# Patient Record
Sex: MALE | Race: WHITE | NOT HISPANIC OR LATINO | Employment: UNEMPLOYED | URBAN - METROPOLITAN AREA
[De-identification: names, ages, dates, MRNs, and addresses within clinical notes are randomized per-mention and may not be internally consistent; named-entity substitution may affect disease eponyms.]

---

## 2022-11-14 ENCOUNTER — HOSPITAL ENCOUNTER (INPATIENT)
Facility: CLINIC | Age: 27
LOS: 3 days | Discharge: HOME | DRG: 897 | End: 2022-11-17
Attending: EMERGENCY MEDICINE | Admitting: FAMILY MEDICINE
Payer: COMMERCIAL

## 2022-11-14 DIAGNOSIS — Z11.52 ENCOUNTER FOR SCREENING LABORATORY TESTING FOR SEVERE ACUTE RESPIRATORY SYNDROME CORONAVIRUS 2 (SARS-COV-2): ICD-10-CM

## 2022-11-14 DIAGNOSIS — F41.0 PANIC ATTACKS: ICD-10-CM

## 2022-11-14 DIAGNOSIS — F33.41 MAJOR DEPRESSIVE DISORDER, RECURRENT, IN PARTIAL REMISSION (H): Primary | ICD-10-CM

## 2022-11-14 DIAGNOSIS — F10.930 ALCOHOL WITHDRAWAL SYNDROME WITHOUT COMPLICATION (H): ICD-10-CM

## 2022-11-14 LAB
ALBUMIN SERPL-MCNC: 4 G/DL (ref 3.4–5)
ALCOHOL BREATH TEST: 0.02 (ref 0–0.01)
ALP SERPL-CCNC: 141 U/L (ref 40–150)
ALT SERPL W P-5'-P-CCNC: 39 U/L (ref 0–70)
AMPHETAMINES UR QL SCN: ABNORMAL
ANION GAP SERPL CALCULATED.3IONS-SCNC: 20 MMOL/L (ref 3–14)
AST SERPL W P-5'-P-CCNC: 46 U/L (ref 0–45)
BARBITURATES UR QL: ABNORMAL
BASOPHILS # BLD MANUAL: 0 10E3/UL (ref 0–0.2)
BASOPHILS NFR BLD MANUAL: 0 %
BENZODIAZ UR QL: ABNORMAL
BILIRUB SERPL-MCNC: 1.5 MG/DL (ref 0.2–1.3)
BUN SERPL-MCNC: 11 MG/DL (ref 7–30)
BURR CELLS BLD QL SMEAR: SLIGHT
CALCIUM SERPL-MCNC: 9.4 MG/DL (ref 8.5–10.1)
CANNABINOIDS UR QL SCN: ABNORMAL
CHLORIDE BLD-SCNC: 94 MMOL/L (ref 94–109)
CO2 SERPL-SCNC: 26 MMOL/L (ref 20–32)
COCAINE UR QL: ABNORMAL
CREAT SERPL-MCNC: 0.58 MG/DL (ref 0.66–1.25)
EOSINOPHIL # BLD MANUAL: 0 10E3/UL (ref 0–0.7)
EOSINOPHIL NFR BLD MANUAL: 0 %
ERYTHROCYTE [DISTWIDTH] IN BLOOD BY AUTOMATED COUNT: 13.9 % (ref 10–15)
ETHANOL UR QL SCN: ABNORMAL
GFR SERPL CREATININE-BSD FRML MDRD: >90 ML/MIN/1.73M2
GLUCOSE BLD-MCNC: 185 MG/DL (ref 70–99)
HCT VFR BLD AUTO: 41.3 % (ref 40–53)
HGB BLD-MCNC: 14.9 G/DL (ref 13.3–17.7)
HOLD SPECIMEN: NORMAL
KETONES BLD-SCNC: 0 MMOL/L (ref 0–0.6)
LACTATE SERPL-SCNC: 1.7 MMOL/L (ref 0.7–2)
LIPASE SERPL-CCNC: 635 U/L (ref 73–393)
LYMPHOCYTES # BLD MANUAL: 0 10E3/UL (ref 0.8–5.3)
LYMPHOCYTES NFR BLD MANUAL: 0 %
MAGNESIUM SERPL-MCNC: 1.3 MG/DL (ref 1.6–2.3)
MCH RBC QN AUTO: 34.1 PG (ref 26.5–33)
MCHC RBC AUTO-ENTMCNC: 36.1 G/DL (ref 31.5–36.5)
MCV RBC AUTO: 95 FL (ref 78–100)
MONOCYTES # BLD MANUAL: 0.8 10E3/UL (ref 0–1.3)
MONOCYTES NFR BLD MANUAL: 4 %
NEUTROPHILS # BLD MANUAL: 18.1 10E3/UL (ref 1.6–8.3)
NEUTROPHILS NFR BLD MANUAL: 96 %
OPIATES UR QL SCN: ABNORMAL
PHOSPHATE SERPL-MCNC: 1.8 MG/DL (ref 2.5–4.5)
PLAT MORPH BLD: ABNORMAL
PLATELET # BLD AUTO: 526 10E3/UL (ref 150–450)
POTASSIUM BLD-SCNC: 3.3 MMOL/L (ref 3.4–5.3)
PROT SERPL-MCNC: 8 G/DL (ref 6.8–8.8)
RBC # BLD AUTO: 4.37 10E6/UL (ref 4.4–5.9)
RBC MORPH BLD: ABNORMAL
SARS-COV-2 RNA RESP QL NAA+PROBE: NEGATIVE
SODIUM SERPL-SCNC: 140 MMOL/L (ref 133–144)
WBC # BLD AUTO: 18.9 10E3/UL (ref 4–11)

## 2022-11-14 PROCEDURE — 84100 ASSAY OF PHOSPHORUS: CPT | Performed by: STUDENT IN AN ORGANIZED HEALTH CARE EDUCATION/TRAINING PROGRAM

## 2022-11-14 PROCEDURE — 96361 HYDRATE IV INFUSION ADD-ON: CPT | Performed by: EMERGENCY MEDICINE

## 2022-11-14 PROCEDURE — 99285 EMERGENCY DEPT VISIT HI MDM: CPT | Mod: 25 | Performed by: EMERGENCY MEDICINE

## 2022-11-14 PROCEDURE — 96374 THER/PROPH/DIAG INJ IV PUSH: CPT | Performed by: EMERGENCY MEDICINE

## 2022-11-14 PROCEDURE — 36415 COLL VENOUS BLD VENIPUNCTURE: CPT | Performed by: EMERGENCY MEDICINE

## 2022-11-14 PROCEDURE — 80053 COMPREHEN METABOLIC PANEL: CPT | Performed by: EMERGENCY MEDICINE

## 2022-11-14 PROCEDURE — 120N000002 HC R&B MED SURG/OB UMMC

## 2022-11-14 PROCEDURE — 250N000013 HC RX MED GY IP 250 OP 250 PS 637: Performed by: STUDENT IN AN ORGANIZED HEALTH CARE EDUCATION/TRAINING PROGRAM

## 2022-11-14 PROCEDURE — 80307 DRUG TEST PRSMV CHEM ANLYZR: CPT | Performed by: EMERGENCY MEDICINE

## 2022-11-14 PROCEDURE — U0003 INFECTIOUS AGENT DETECTION BY NUCLEIC ACID (DNA OR RNA); SEVERE ACUTE RESPIRATORY SYNDROME CORONAVIRUS 2 (SARS-COV-2) (CORONAVIRUS DISEASE [COVID-19]), AMPLIFIED PROBE TECHNIQUE, MAKING USE OF HIGH THROUGHPUT TECHNOLOGIES AS DESCRIBED BY CMS-2020-01-R: HCPCS | Performed by: EMERGENCY MEDICINE

## 2022-11-14 PROCEDURE — 85027 COMPLETE CBC AUTOMATED: CPT | Performed by: EMERGENCY MEDICINE

## 2022-11-14 PROCEDURE — 258N000003 HC RX IP 258 OP 636: Performed by: STUDENT IN AN ORGANIZED HEALTH CARE EDUCATION/TRAINING PROGRAM

## 2022-11-14 PROCEDURE — 258N000003 HC RX IP 258 OP 636: Performed by: EMERGENCY MEDICINE

## 2022-11-14 PROCEDURE — 83690 ASSAY OF LIPASE: CPT | Performed by: STUDENT IN AN ORGANIZED HEALTH CARE EDUCATION/TRAINING PROGRAM

## 2022-11-14 PROCEDURE — HZ2ZZZZ DETOXIFICATION SERVICES FOR SUBSTANCE ABUSE TREATMENT: ICD-10-PCS | Performed by: FAMILY MEDICINE

## 2022-11-14 PROCEDURE — 96375 TX/PRO/DX INJ NEW DRUG ADDON: CPT | Performed by: EMERGENCY MEDICINE

## 2022-11-14 PROCEDURE — 83735 ASSAY OF MAGNESIUM: CPT | Performed by: STUDENT IN AN ORGANIZED HEALTH CARE EDUCATION/TRAINING PROGRAM

## 2022-11-14 PROCEDURE — 250N000013 HC RX MED GY IP 250 OP 250 PS 637: Performed by: EMERGENCY MEDICINE

## 2022-11-14 PROCEDURE — 250N000011 HC RX IP 250 OP 636: Performed by: EMERGENCY MEDICINE

## 2022-11-14 PROCEDURE — 250N000011 HC RX IP 250 OP 636: Performed by: STUDENT IN AN ORGANIZED HEALTH CARE EDUCATION/TRAINING PROGRAM

## 2022-11-14 PROCEDURE — C9803 HOPD COVID-19 SPEC COLLECT: HCPCS | Performed by: EMERGENCY MEDICINE

## 2022-11-14 PROCEDURE — 258N000003 HC RX IP 258 OP 636

## 2022-11-14 PROCEDURE — 99285 EMERGENCY DEPT VISIT HI MDM: CPT | Performed by: EMERGENCY MEDICINE

## 2022-11-14 PROCEDURE — 85007 BL SMEAR W/DIFF WBC COUNT: CPT | Performed by: EMERGENCY MEDICINE

## 2022-11-14 PROCEDURE — 82010 KETONE BODYS QUAN: CPT | Performed by: EMERGENCY MEDICINE

## 2022-11-14 PROCEDURE — 99222 1ST HOSP IP/OBS MODERATE 55: CPT | Mod: AI | Performed by: FAMILY MEDICINE

## 2022-11-14 PROCEDURE — 83605 ASSAY OF LACTIC ACID: CPT | Performed by: EMERGENCY MEDICINE

## 2022-11-14 PROCEDURE — 82075 ASSAY OF BREATH ETHANOL: CPT | Performed by: EMERGENCY MEDICINE

## 2022-11-14 RX ORDER — OLANZAPINE 5 MG/1
5-10 TABLET, ORALLY DISINTEGRATING ORAL EVERY 6 HOURS PRN
Status: DISCONTINUED | OUTPATIENT
Start: 2022-11-14 | End: 2022-11-17 | Stop reason: HOSPADM

## 2022-11-14 RX ORDER — FOLIC ACID 1 MG/1
1 TABLET ORAL DAILY
Status: DISCONTINUED | OUTPATIENT
Start: 2022-11-14 | End: 2022-11-17 | Stop reason: HOSPADM

## 2022-11-14 RX ORDER — SODIUM CHLORIDE 9 MG/ML
INJECTION, SOLUTION INTRAVENOUS
Status: COMPLETED
Start: 2022-11-14 | End: 2022-11-14

## 2022-11-14 RX ORDER — HALOPERIDOL 5 MG/ML
2.5-5 INJECTION INTRAMUSCULAR EVERY 6 HOURS PRN
Status: DISCONTINUED | OUTPATIENT
Start: 2022-11-14 | End: 2022-11-17 | Stop reason: HOSPADM

## 2022-11-14 RX ORDER — CALCIUM CARBONATE 500 MG/1
500 TABLET, CHEWABLE ORAL 2 TIMES DAILY PRN
Status: DISCONTINUED | OUTPATIENT
Start: 2022-11-14 | End: 2022-11-17 | Stop reason: HOSPADM

## 2022-11-14 RX ORDER — GABAPENTIN 300 MG/1
600 CAPSULE ORAL EVERY 8 HOURS
Status: DISCONTINUED | OUTPATIENT
Start: 2022-11-18 | End: 2022-11-17 | Stop reason: HOSPADM

## 2022-11-14 RX ORDER — GABAPENTIN 600 MG/1
1200 TABLET ORAL ONCE
Status: COMPLETED | OUTPATIENT
Start: 2022-11-14 | End: 2022-11-14

## 2022-11-14 RX ORDER — LIDOCAINE 40 MG/G
CREAM TOPICAL
Status: DISCONTINUED | OUTPATIENT
Start: 2022-11-14 | End: 2022-11-14

## 2022-11-14 RX ORDER — BACLOFEN 20 MG/1
20 TABLET ORAL 3 TIMES DAILY
Status: ON HOLD | COMMUNITY
Start: 2021-12-16 | End: 2022-11-17

## 2022-11-14 RX ORDER — LIDOCAINE 40 MG/G
CREAM TOPICAL
Status: DISCONTINUED | OUTPATIENT
Start: 2022-11-14 | End: 2022-11-17 | Stop reason: HOSPADM

## 2022-11-14 RX ORDER — GABAPENTIN 300 MG/1
900 CAPSULE ORAL EVERY 8 HOURS
Status: DISCONTINUED | OUTPATIENT
Start: 2022-11-15 | End: 2022-11-17 | Stop reason: HOSPADM

## 2022-11-14 RX ORDER — DIAZEPAM 10 MG
10 TABLET ORAL EVERY 30 MIN PRN
Status: DISCONTINUED | OUTPATIENT
Start: 2022-11-14 | End: 2022-11-17 | Stop reason: HOSPADM

## 2022-11-14 RX ORDER — ONDANSETRON 2 MG/ML
4 INJECTION INTRAMUSCULAR; INTRAVENOUS EVERY 30 MIN PRN
Status: DISCONTINUED | OUTPATIENT
Start: 2022-11-14 | End: 2022-11-14

## 2022-11-14 RX ORDER — GABAPENTIN 300 MG/1
300 CAPSULE ORAL EVERY 8 HOURS
Status: DISCONTINUED | OUTPATIENT
Start: 2022-11-20 | End: 2022-11-17 | Stop reason: HOSPADM

## 2022-11-14 RX ORDER — POTASSIUM CHLORIDE 1.5 G/1.58G
20 POWDER, FOR SOLUTION ORAL ONCE
Status: COMPLETED | OUTPATIENT
Start: 2022-11-14 | End: 2022-11-14

## 2022-11-14 RX ORDER — AMOXICILLIN 250 MG
1 CAPSULE ORAL 2 TIMES DAILY PRN
Status: DISCONTINUED | OUTPATIENT
Start: 2022-11-14 | End: 2022-11-14

## 2022-11-14 RX ORDER — ONDANSETRON 4 MG/1
4 TABLET, ORALLY DISINTEGRATING ORAL EVERY 6 HOURS PRN
Status: DISCONTINUED | OUTPATIENT
Start: 2022-11-14 | End: 2022-11-17 | Stop reason: HOSPADM

## 2022-11-14 RX ORDER — DIAZEPAM 5 MG
5-20 TABLET ORAL EVERY 30 MIN PRN
Status: DISCONTINUED | OUTPATIENT
Start: 2022-11-14 | End: 2022-11-14

## 2022-11-14 RX ORDER — AMOXICILLIN 250 MG
2 CAPSULE ORAL 2 TIMES DAILY PRN
Status: DISCONTINUED | OUTPATIENT
Start: 2022-11-14 | End: 2022-11-14

## 2022-11-14 RX ORDER — IBUPROFEN 200 MG
600 TABLET ORAL EVERY 6 HOURS PRN
Status: DISCONTINUED | OUTPATIENT
Start: 2022-11-14 | End: 2022-11-17 | Stop reason: HOSPADM

## 2022-11-14 RX ORDER — PROCHLORPERAZINE 25 MG
25 SUPPOSITORY, RECTAL RECTAL EVERY 12 HOURS PRN
Status: DISCONTINUED | OUTPATIENT
Start: 2022-11-14 | End: 2022-11-17 | Stop reason: HOSPADM

## 2022-11-14 RX ORDER — QUETIAPINE FUMARATE 25 MG/1
25 TABLET, FILM COATED ORAL AT BEDTIME
Status: DISCONTINUED | OUTPATIENT
Start: 2022-11-14 | End: 2022-11-17 | Stop reason: HOSPADM

## 2022-11-14 RX ORDER — QUETIAPINE FUMARATE 25 MG/1
25 TABLET, FILM COATED ORAL AT BEDTIME
Status: ON HOLD | COMMUNITY
Start: 2022-03-01 | End: 2022-11-17

## 2022-11-14 RX ORDER — FLUMAZENIL 0.1 MG/ML
0.2 INJECTION, SOLUTION INTRAVENOUS
Status: DISCONTINUED | OUTPATIENT
Start: 2022-11-14 | End: 2022-11-17 | Stop reason: HOSPADM

## 2022-11-14 RX ORDER — BACLOFEN 20 MG/1
20 TABLET ORAL 3 TIMES DAILY
Status: DISCONTINUED | OUTPATIENT
Start: 2022-11-14 | End: 2022-11-17 | Stop reason: HOSPADM

## 2022-11-14 RX ORDER — ONDANSETRON 2 MG/ML
4 INJECTION INTRAMUSCULAR; INTRAVENOUS EVERY 6 HOURS PRN
Status: DISCONTINUED | OUTPATIENT
Start: 2022-11-14 | End: 2022-11-17 | Stop reason: HOSPADM

## 2022-11-14 RX ORDER — CLONIDINE HYDROCHLORIDE 0.1 MG/1
0.1 TABLET ORAL EVERY 8 HOURS
Status: DISCONTINUED | OUTPATIENT
Start: 2022-11-14 | End: 2022-11-17 | Stop reason: HOSPADM

## 2022-11-14 RX ORDER — DIAZEPAM 10 MG/2ML
5 INJECTION, SOLUTION INTRAMUSCULAR; INTRAVENOUS ONCE
Status: COMPLETED | OUTPATIENT
Start: 2022-11-14 | End: 2022-11-14

## 2022-11-14 RX ORDER — GABAPENTIN 100 MG/1
100 CAPSULE ORAL EVERY 8 HOURS
Status: DISCONTINUED | OUTPATIENT
Start: 2022-11-22 | End: 2022-11-17 | Stop reason: HOSPADM

## 2022-11-14 RX ORDER — PROCHLORPERAZINE MALEATE 5 MG
5-10 TABLET ORAL EVERY 6 HOURS PRN
Status: DISCONTINUED | OUTPATIENT
Start: 2022-11-14 | End: 2022-11-17 | Stop reason: HOSPADM

## 2022-11-14 RX ORDER — DIAZEPAM 10 MG/2ML
5-10 INJECTION, SOLUTION INTRAMUSCULAR; INTRAVENOUS EVERY 30 MIN PRN
Status: DISCONTINUED | OUTPATIENT
Start: 2022-11-14 | End: 2022-11-17 | Stop reason: HOSPADM

## 2022-11-14 RX ORDER — SODIUM CHLORIDE 9 MG/ML
INJECTION, SOLUTION INTRAVENOUS CONTINUOUS
Status: DISCONTINUED | OUTPATIENT
Start: 2022-11-14 | End: 2022-11-16

## 2022-11-14 RX ORDER — MULTIPLE VITAMINS W/ MINERALS TAB 9MG-400MCG
1 TAB ORAL DAILY
Status: DISCONTINUED | OUTPATIENT
Start: 2022-11-14 | End: 2022-11-17 | Stop reason: HOSPADM

## 2022-11-14 RX ADMIN — SODIUM CHLORIDE 1000 ML: 9 INJECTION, SOLUTION INTRAVENOUS at 13:48

## 2022-11-14 RX ADMIN — DIAZEPAM 10 MG: 10 TABLET ORAL at 23:46

## 2022-11-14 RX ADMIN — QUETIAPINE FUMARATE 25 MG: 25 TABLET ORAL at 23:46

## 2022-11-14 RX ADMIN — DIAZEPAM 10 MG: 5 TABLET ORAL at 16:09

## 2022-11-14 RX ADMIN — SODIUM CHLORIDE: 9 INJECTION, SOLUTION INTRAVENOUS at 22:43

## 2022-11-14 RX ADMIN — SODIUM CHLORIDE: 9 INJECTION, SOLUTION INTRAVENOUS at 14:34

## 2022-11-14 RX ADMIN — DIAZEPAM 10 MG: 5 TABLET ORAL at 17:07

## 2022-11-14 RX ADMIN — DIAZEPAM 10 MG: 10 TABLET ORAL at 20:28

## 2022-11-14 RX ADMIN — ONDANSETRON 4 MG: 2 INJECTION INTRAMUSCULAR; INTRAVENOUS at 13:49

## 2022-11-14 RX ADMIN — CLONIDINE HYDROCHLORIDE 0.1 MG: 0.1 TABLET ORAL at 20:24

## 2022-11-14 RX ADMIN — GABAPENTIN 1200 MG: 600 TABLET, FILM COATED ORAL at 20:24

## 2022-11-14 RX ADMIN — DIAZEPAM 10 MG: 5 TABLET ORAL at 19:07

## 2022-11-14 RX ADMIN — ONDANSETRON 4 MG: 2 INJECTION INTRAMUSCULAR; INTRAVENOUS at 17:11

## 2022-11-14 RX ADMIN — POTASSIUM CHLORIDE 20 MEQ: 1.5 FOR SOLUTION ORAL at 17:11

## 2022-11-14 RX ADMIN — DIAZEPAM 5 MG: 5 INJECTION, SOLUTION INTRAMUSCULAR; INTRAVENOUS at 14:04

## 2022-11-14 RX ADMIN — THIAMINE HCL TAB 100 MG 100 MG: 100 TAB at 20:24

## 2022-11-14 RX ADMIN — FOLIC ACID 1 MG: 1 TABLET ORAL at 20:24

## 2022-11-14 RX ADMIN — PROCHLORPERAZINE EDISYLATE 10 MG: 5 INJECTION INTRAMUSCULAR; INTRAVENOUS at 21:54

## 2022-11-14 RX ADMIN — CALCIUM CARBONATE (ANTACID) CHEW TAB 500 MG 500 MG: 500 CHEW TAB at 20:23

## 2022-11-14 RX ADMIN — Medication 1 TABLET: at 20:24

## 2022-11-14 RX ADMIN — BACLOFEN 20 MG: 20 TABLET ORAL at 20:23

## 2022-11-14 ASSESSMENT — ACTIVITIES OF DAILY LIVING (ADL)
ADLS_ACUITY_SCORE: 35

## 2022-11-14 NOTE — ED NOTES
Pt stated only slightly better since getting the valium.  Repeat MSSA done and Pt given another PO dose of med for withdrawal and IV med for nausea.  Pt being monitored for effect.

## 2022-11-14 NOTE — ED TRIAGE NOTES
Patient denies any hx of withdrawal seizure or DT's     Triage Assessment     Row Name 11/14/22 1310       Triage Assessment (Adult)    Airway WDL WDL       Respiratory WDL    Respiratory WDL WDL       Skin Circulation/Temperature WDL    Skin Circulation/Temperature WDL WDL       Cardiac WDL    Cardiac WDL WDL       Peripheral/Neurovascular WDL    Peripheral Neurovascular WDL WDL

## 2022-11-14 NOTE — ED PROVIDER NOTES
"ED Provider Note  [facility]      History     Chief Complaint   Patient presents with     Drug / Alcohol Assessment     Here for detox from alcohol. Drinks a 1 liter of vodka daily, last drink was at 10 pm last night     Abdominal Pain     Abdominal pain that started this morning, patient does have a hx of pancreatis     Nausea & Vomiting     Started this morning, patent vomited prior to triage.      HPI  Brenden Bishop is a 27 year old male who presents for alcohol detox.  Drinks proximately 1 L of hard alcohol per day.  They have a history of withdrawal symptoms and have not had a seizure from alcohol in the past.  Last drink was last evening.  Patient also occasionally uses cannabis.  No suicidal or homicidal ideation.  No recent illness.  No other symptoms noted.    Past Medical History  History reviewed. No pertinent past medical history.  Past Surgical History:   Procedure Laterality Date     ORTHOPEDIC SURGERY      wrist surg      baclofen (LIORESAL) 20 MG tablet  QUEtiapine (SEROQUEL) 25 MG tablet  FLUoxetine (PROZAC) 20 MG capsule      No Known Allergies  Family History  History reviewed. No pertinent family history.  Social History   Social History     Tobacco Use     Smoking status: Former     Smokeless tobacco: Current   Substance Use Topics     Alcohol use: Yes     Comment: 1 liter daily     Drug use: Yes     Types: Marijuana      Past medical history, past surgical history, medications, allergies, family history, and social history were reviewed with the patient. No additional pertinent items.       Review of Systems  A complete review of systems was performed with pertinent positives and negatives noted in the HPI, and all other systems negative.    Physical Exam   BP: (!) 155/97  Pulse: (!) 133  Temp: 98  F (36.7  C)  Resp: 16  Height: 177.8 cm (5' 10\")  Weight: 71.7 kg (158 lb)  SpO2: 98 %  Physical Exam  Constitutional:       General: He is not in acute distress.     " Appearance: He is well-developed and well-nourished. He is ill-appearing and diaphoretic.   HENT:      Head: Normocephalic and atraumatic.      Mouth/Throat:      Pharynx: No oropharyngeal exudate.   Eyes:      General: No scleral icterus.        Right eye: No discharge.         Left eye: No discharge.      Pupils: Pupils are equal, round, and reactive to light.   Cardiovascular:      Rate and Rhythm: Regular rhythm. Tachycardia present.      Pulses: Intact distal pulses.      Heart sounds: Normal heart sounds. No murmur heard.    No friction rub. No gallop.   Pulmonary:      Effort: Pulmonary effort is normal. Tachypnea present. No respiratory distress.      Breath sounds: Normal breath sounds. No wheezing.   Chest:      Chest wall: No tenderness.   Abdominal:      General: Bowel sounds are normal. There is no distension.      Palpations: Abdomen is soft.      Tenderness: There is no abdominal tenderness.   Musculoskeletal:         General: No tenderness, deformity or edema. Normal range of motion.      Cervical back: Normal range of motion and neck supple.   Skin:     General: Skin is warm.      Coloration: Skin is not pale.      Findings: No erythema or rash.   Neurological:      Mental Status: He is alert and oriented to person, place, and time.      Cranial Nerves: No cranial nerve deficit.      Motor: Tremor present.   Psychiatric:         Mood and Affect: Mood and affect normal.         ED Course      Procedures                     Results for orders placed or performed during the hospital encounter of 11/14/22   [place] Draw     Status: None    Narrative    The following orders were created for panel order [place] Draw.  Procedure                               Abnormality         Status                     ---------                               -----------         ------                     Extra Blue Top Tube[[id number]]                              Final result               Extra Red Top Tube[[id number]]                                Final result               Extra Green Top (Lithium...[[id number]]                      Final result               Extra Purple Top Tube[[id number]]                            Final result                 Please view results for these tests on the individual orders.   Extra Blue Top Tube     Status: None   Result Value Ref Range    Hold Specimen JIC    Extra Red Top Tube     Status: None   Result Value Ref Range    Hold Specimen JIC    Extra Green Top (Lithium Heparin) Tube     Status: None   Result Value Ref Range    Hold Specimen JIC    Extra Purple Top Tube     Status: None   Result Value Ref Range    Hold Specimen JIC    Comprehensive metabolic panel     Status: Abnormal   Result Value Ref Range    Sodium 140 133 - 144 mmol/L    Potassium 3.3 (L) 3.4 - 5.3 mmol/L    Chloride 94 94 - 109 mmol/L    Carbon Dioxide (CO2) 26 20 - 32 mmol/L    Anion Gap 20 (H) 3 - 14 mmol/L    Urea Nitrogen 11 7 - 30 mg/dL    Creatinine 0.58 (L) 0.66 - 1.25 mg/dL    Calcium 9.4 8.5 - 10.1 mg/dL    Glucose 185 (H) 70 - 99 mg/dL    Alkaline Phosphatase 141 40 - 150 U/L    AST 46 (H) 0 - 45 U/L    ALT 39 0 - 70 U/L    Protein Total 8.0 6.8 - 8.8 g/dL    Albumin 4.0 3.4 - 5.0 g/dL    Bilirubin Total 1.5 (H) 0.2 - 1.3 mg/dL    GFR Estimate >90 >60 mL/min/1.73m2   Asymptomatic COVID-19 Virus (Coronavirus) by PCR Nasopharyngeal     Status: Normal    Specimen: Nasopharyngeal; Swab   Result Value Ref Range    SARS CoV2 PCR Negative Negative    Narrative    Testing was performed using the Xpert Xpress SARS-CoV-2 Assay on the   Cepheid Gene-Xpert Instrument Systems. Additional information about   this Emergency Use Authorization (EUA) assay can be found via the Lab   Guide. This test should be ordered for the detection of SARS-CoV-2 in   individuals who meet SARS-CoV-2 clinical and/or epidemiological   criteria. Test performance is unknown in asymptomatic patients. This   test is for in vitro diagnostic use under the  FDA EUA for   laboratories certified under CLIA to perform high complexity testing.   This test has not been FDA cleared or approved. A negative result   does not rule out the presence of PCR inhibitors in the specimen or   target RNA in concentration below the limit of detection for the   assay. The possibility of a false negative should be considered if   the patient's recent exposure or clinical presentation suggests   COVID-19. This test was validated by the [facility]. This laboratory is certified under the Clinical Laboratory Improvement Amendments of 1988 (CLIA-88) as qualified to perform high complexity laboratory testing.     CBC with platelets and differential     Status: Abnormal   Result Value Ref Range    WBC Count 18.9 (H) 4.0 - 11.0 10e3/uL    RBC Count 4.37 (L) 4.40 - 5.90 10e6/uL    Hemoglobin 14.9 13.3 - 17.7 g/dL    Hematocrit 41.3 40.0 - 53.0 %    MCV 95 78 - 100 fL    MCH 34.1 (H) 26.5 - 33.0 pg    MCHC 36.1 31.5 - 36.5 g/dL    RDW 13.9 10.0 - 15.0 %    Platelet Count 526 (H) 150 - 450 10e3/uL   Manual Differential     Status: Abnormal   Result Value Ref Range    % Neutrophils 96 %    % Lymphocytes 0 %    % Monocytes 4 %    % Eosinophils 0 %    % Basophils 0 %    Absolute Neutrophils 18.1 (H) 1.6 - 8.3 10e3/uL    Absolute Lymphocytes 0.0 (L) 0.8 - 5.3 10e3/uL    Absolute Monocytes 0.8 0.0 - 1.3 10e3/uL    Absolute Eosinophils 0.0 0.0 - 0.7 10e3/uL    Absolute Basophils 0.0 0.0 - 0.2 10e3/uL    RBC Morphology Confirmed RBC Indices     Platelet Assessment  Automated Count Confirmed. Platelet morphology is normal.     Automated Count Confirmed. Platelet morphology is normal.    [employer] Cells Slight (A) None Seen   Lactic acid whole blood     Status: Normal   Result Value Ref Range    Lactic Acid 1.7 0.7 - 2.0 mmol/L   Alcohol breath test POCT     Status: Abnormal   Result Value Ref Range    Alcohol Breath Test 0.023 (A) 0.00 - 0.01   CBC with platelets differential      Status: Abnormal    Narrative    The following orders were created for panel order CBC with platelets differential.  Procedure                               Abnormality         Status                     ---------                               -----------         ------                     CBC with platelets and d...[[id number]]  Abnormal            Final result               Manual Differential[[id number]]          Abnormal            Final result                 Please view results for these tests on the individual orders.     Medications   diazepam (VALIUM) tablet 5-20 mg (10 mg Oral Given 11/14/22 1609)   0.9% sodium chloride BOLUS (0 mLs Intravenous Stopped 11/14/22 1430)     Followed by   sodium chloride 0.9% infusion ( Intravenous New Bag 11/14/22 1434)   ondansetron (ZOFRAN) injection 4 mg (4 mg Intravenous Given 11/14/22 1349)   potassium chloride (KLOR-CON) Packet 20 mEq (has no administration in time range)   diazepam (VALIUM) injection 5 mg (5 mg Intravenous Given 11/14/22 1404)        Assessments & Plan (with Medical Decision Making)   This is a 27-year-old male who presents for alcohol detox.  Last alcohol intake was last evening.  Alcohol level is 0.023.  Exam demonstrates diaphoresis, tachycardia, tachypnea, and tremor.  Lab work shows WBC count of 18.9.  Potassium is 3.3.  This was replaced.  Anion gap is 20.  Patient was given IV fluids, ondansetron and diazepam.  Initial plan was for detox admission however patient was consistently tachycardic and tachypneic despite these interventions.  Patient will need to be admitted medically.  We will obtain ketones. We will admit for further monitoring work-up and treatment.    I have reviewed the nursing notes. I have reviewed the findings, diagnosis, plan and need for follow up with the patient.    New Prescriptions    No medications on file       Final diagnoses:   Alcohol withdrawal syndrome without complication (H)       --  Kendall MCGEE  [facility] EMERGENCY DEPARTMENT  11/14/2022     Kendall Yuen,   11/14/22 5133

## 2022-11-15 LAB
ALBUMIN SERPL-MCNC: 2.9 G/DL (ref 3.4–5)
ALP SERPL-CCNC: 98 U/L (ref 40–150)
ALT SERPL W P-5'-P-CCNC: 25 U/L (ref 0–70)
ANION GAP SERPL CALCULATED.3IONS-SCNC: 6 MMOL/L (ref 3–14)
AST SERPL W P-5'-P-CCNC: 32 U/L (ref 0–45)
ATRIAL RATE - MUSE: 113 BPM
BASOPHILS # BLD AUTO: 0 10E3/UL (ref 0–0.2)
BASOPHILS NFR BLD AUTO: 0 %
BILIRUB SERPL-MCNC: 2.2 MG/DL (ref 0.2–1.3)
BUN SERPL-MCNC: 12 MG/DL (ref 7–30)
CALCIUM SERPL-MCNC: 8.5 MG/DL (ref 8.5–10.1)
CHLORIDE BLD-SCNC: 101 MMOL/L (ref 94–109)
CO2 SERPL-SCNC: 33 MMOL/L (ref 20–32)
CREAT SERPL-MCNC: 0.68 MG/DL (ref 0.66–1.25)
DIASTOLIC BLOOD PRESSURE - MUSE: NORMAL MMHG
EOSINOPHIL # BLD AUTO: 0 10E3/UL (ref 0–0.7)
EOSINOPHIL NFR BLD AUTO: 0 %
ERYTHROCYTE [DISTWIDTH] IN BLOOD BY AUTOMATED COUNT: 14.2 % (ref 10–15)
GFR SERPL CREATININE-BSD FRML MDRD: >90 ML/MIN/1.73M2
GLUCOSE BLD-MCNC: 84 MG/DL (ref 70–99)
HCT VFR BLD AUTO: 34.2 % (ref 40–53)
HGB BLD-MCNC: 12.1 G/DL (ref 13.3–17.7)
HOLD SPECIMEN: NORMAL
HOLD SPECIMEN: NORMAL
IMM GRANULOCYTES # BLD: 0.1 10E3/UL
IMM GRANULOCYTES NFR BLD: 1 %
INTERPRETATION ECG - MUSE: NORMAL
LYMPHOCYTES # BLD AUTO: 1.2 10E3/UL (ref 0.8–5.3)
LYMPHOCYTES NFR BLD AUTO: 12 %
MAGNESIUM SERPL-MCNC: 1.6 MG/DL (ref 1.6–2.3)
MCH RBC QN AUTO: 34.5 PG (ref 26.5–33)
MCHC RBC AUTO-ENTMCNC: 35.4 G/DL (ref 31.5–36.5)
MCV RBC AUTO: 97 FL (ref 78–100)
MONOCYTES # BLD AUTO: 0.5 10E3/UL (ref 0–1.3)
MONOCYTES NFR BLD AUTO: 5 %
NEUTROPHILS # BLD AUTO: 8.2 10E3/UL (ref 1.6–8.3)
NEUTROPHILS NFR BLD AUTO: 82 %
NRBC # BLD AUTO: 0 10E3/UL
NRBC BLD AUTO-RTO: 0 /100
P AXIS - MUSE: 54 DEGREES
PATH REPORT.COMMENTS IMP SPEC: NORMAL
PATH REPORT.FINAL DX SPEC: NORMAL
PATH REPORT.MICROSCOPIC SPEC OTHER STN: NORMAL
PATH REPORT.MICROSCOPIC SPEC OTHER STN: NORMAL
PATH REPORT.RELEVANT HX SPEC: NORMAL
PHOSPHATE SERPL-MCNC: 1.6 MG/DL (ref 2.5–4.5)
PHOSPHATE SERPL-MCNC: 1.6 MG/DL (ref 2.5–4.5)
PLATELET # BLD AUTO: 310 10E3/UL (ref 150–450)
POTASSIUM BLD-SCNC: 2.8 MMOL/L (ref 3.4–5.3)
POTASSIUM BLD-SCNC: 4.7 MMOL/L (ref 3.4–5.3)
PR INTERVAL - MUSE: 164 MS
PROT SERPL-MCNC: 6 G/DL (ref 6.8–8.8)
QRS DURATION - MUSE: 88 MS
QT - MUSE: 336 MS
QTC - MUSE: 460 MS
R AXIS - MUSE: 70 DEGREES
RBC # BLD AUTO: 3.51 10E6/UL (ref 4.4–5.9)
RETICS # AUTO: 0.04 10E6/UL (ref 0.03–0.1)
RETICS/RBC NFR AUTO: 1.3 % (ref 0.5–2)
SODIUM SERPL-SCNC: 140 MMOL/L (ref 133–144)
SYSTOLIC BLOOD PRESSURE - MUSE: NORMAL MMHG
T AXIS - MUSE: 60 DEGREES
VENTRICULAR RATE- MUSE: 113 BPM
WBC # BLD AUTO: 10 10E3/UL (ref 4–11)

## 2022-11-15 PROCEDURE — 85045 AUTOMATED RETICULOCYTE COUNT: CPT | Performed by: STUDENT IN AN ORGANIZED HEALTH CARE EDUCATION/TRAINING PROGRAM

## 2022-11-15 PROCEDURE — 250N000009 HC RX 250: Performed by: STUDENT IN AN ORGANIZED HEALTH CARE EDUCATION/TRAINING PROGRAM

## 2022-11-15 PROCEDURE — 120N000002 HC R&B MED SURG/OB UMMC

## 2022-11-15 PROCEDURE — 258N000003 HC RX IP 258 OP 636: Performed by: STUDENT IN AN ORGANIZED HEALTH CARE EDUCATION/TRAINING PROGRAM

## 2022-11-15 PROCEDURE — 83735 ASSAY OF MAGNESIUM: CPT | Performed by: STUDENT IN AN ORGANIZED HEALTH CARE EDUCATION/TRAINING PROGRAM

## 2022-11-15 PROCEDURE — 250N000011 HC RX IP 250 OP 636: Performed by: STUDENT IN AN ORGANIZED HEALTH CARE EDUCATION/TRAINING PROGRAM

## 2022-11-15 PROCEDURE — 85014 HEMATOCRIT: CPT | Performed by: STUDENT IN AN ORGANIZED HEALTH CARE EDUCATION/TRAINING PROGRAM

## 2022-11-15 PROCEDURE — 250N000013 HC RX MED GY IP 250 OP 250 PS 637: Performed by: STUDENT IN AN ORGANIZED HEALTH CARE EDUCATION/TRAINING PROGRAM

## 2022-11-15 PROCEDURE — 36415 COLL VENOUS BLD VENIPUNCTURE: CPT | Performed by: STUDENT IN AN ORGANIZED HEALTH CARE EDUCATION/TRAINING PROGRAM

## 2022-11-15 PROCEDURE — 85060 BLOOD SMEAR INTERPRETATION: CPT | Performed by: PATHOLOGY

## 2022-11-15 PROCEDURE — 84100 ASSAY OF PHOSPHORUS: CPT | Performed by: STUDENT IN AN ORGANIZED HEALTH CARE EDUCATION/TRAINING PROGRAM

## 2022-11-15 PROCEDURE — 84132 ASSAY OF SERUM POTASSIUM: CPT | Performed by: STUDENT IN AN ORGANIZED HEALTH CARE EDUCATION/TRAINING PROGRAM

## 2022-11-15 PROCEDURE — 99233 SBSQ HOSP IP/OBS HIGH 50: CPT | Mod: GC | Performed by: FAMILY MEDICINE

## 2022-11-15 PROCEDURE — 82947 ASSAY GLUCOSE BLOOD QUANT: CPT | Performed by: STUDENT IN AN ORGANIZED HEALTH CARE EDUCATION/TRAINING PROGRAM

## 2022-11-15 PROCEDURE — 82435 ASSAY OF BLOOD CHLORIDE: CPT | Performed by: STUDENT IN AN ORGANIZED HEALTH CARE EDUCATION/TRAINING PROGRAM

## 2022-11-15 RX ORDER — FAMOTIDINE 10 MG
10 TABLET ORAL 2 TIMES DAILY PRN
Status: DISCONTINUED | OUTPATIENT
Start: 2022-11-15 | End: 2022-11-17 | Stop reason: HOSPADM

## 2022-11-15 RX ORDER — POTASSIUM CHLORIDE 1500 MG/1
20 TABLET, EXTENDED RELEASE ORAL ONCE
Status: COMPLETED | OUTPATIENT
Start: 2022-11-15 | End: 2022-11-15

## 2022-11-15 RX ORDER — POTASSIUM CHLORIDE 1500 MG/1
40 TABLET, EXTENDED RELEASE ORAL ONCE
Status: COMPLETED | OUTPATIENT
Start: 2022-11-15 | End: 2022-11-15

## 2022-11-15 RX ADMIN — CALCIUM CARBONATE (ANTACID) CHEW TAB 500 MG 500 MG: 500 CHEW TAB at 21:44

## 2022-11-15 RX ADMIN — THIAMINE HCL TAB 100 MG 100 MG: 100 TAB at 08:01

## 2022-11-15 RX ADMIN — SODIUM CHLORIDE: 9 INJECTION, SOLUTION INTRAVENOUS at 09:17

## 2022-11-15 RX ADMIN — Medication 1 TABLET: at 08:00

## 2022-11-15 RX ADMIN — BACLOFEN 20 MG: 20 TABLET ORAL at 11:31

## 2022-11-15 RX ADMIN — BACLOFEN 20 MG: 20 TABLET ORAL at 15:32

## 2022-11-15 RX ADMIN — GABAPENTIN 900 MG: 300 CAPSULE ORAL at 03:35

## 2022-11-15 RX ADMIN — DIAZEPAM 10 MG: 10 TABLET ORAL at 11:43

## 2022-11-15 RX ADMIN — DIAZEPAM 10 MG: 10 TABLET ORAL at 18:13

## 2022-11-15 RX ADMIN — CLONIDINE HYDROCHLORIDE 0.1 MG: 0.1 TABLET ORAL at 03:35

## 2022-11-15 RX ADMIN — POTASSIUM CHLORIDE 40 MEQ: 1500 TABLET, EXTENDED RELEASE ORAL at 11:43

## 2022-11-15 RX ADMIN — ONDANSETRON 4 MG: 4 TABLET, ORALLY DISINTEGRATING ORAL at 09:21

## 2022-11-15 RX ADMIN — THIAMINE HYDROCHLORIDE 500 MG: 100 INJECTION, SOLUTION INTRAMUSCULAR; INTRAVENOUS at 20:31

## 2022-11-15 RX ADMIN — THIAMINE HYDROCHLORIDE 500 MG: 100 INJECTION, SOLUTION INTRAMUSCULAR; INTRAVENOUS at 13:45

## 2022-11-15 RX ADMIN — POTASSIUM PHOSPHATE, MONOBASIC AND POTASSIUM PHOSPHATE, DIBASIC 15 MMOL: 224; 236 INJECTION, SOLUTION INTRAVENOUS at 14:19

## 2022-11-15 RX ADMIN — CLONIDINE HYDROCHLORIDE 0.1 MG: 0.1 TABLET ORAL at 11:32

## 2022-11-15 RX ADMIN — QUETIAPINE FUMARATE 25 MG: 25 TABLET ORAL at 21:28

## 2022-11-15 RX ADMIN — DIAZEPAM 10 MG: 10 TABLET ORAL at 13:15

## 2022-11-15 RX ADMIN — THIAMINE HYDROCHLORIDE 500 MG: 100 INJECTION, SOLUTION INTRAMUSCULAR; INTRAVENOUS at 12:24

## 2022-11-15 RX ADMIN — GABAPENTIN 900 MG: 300 CAPSULE ORAL at 11:32

## 2022-11-15 RX ADMIN — FOLIC ACID 1 MG: 1 TABLET ORAL at 08:01

## 2022-11-15 RX ADMIN — CLONIDINE HYDROCHLORIDE 0.1 MG: 0.1 TABLET ORAL at 20:27

## 2022-11-15 RX ADMIN — GABAPENTIN 900 MG: 300 CAPSULE ORAL at 20:27

## 2022-11-15 RX ADMIN — POTASSIUM CHLORIDE 20 MEQ: 1500 TABLET, EXTENDED RELEASE ORAL at 13:15

## 2022-11-15 RX ADMIN — BACLOFEN 20 MG: 20 TABLET ORAL at 20:27

## 2022-11-15 ASSESSMENT — ACTIVITIES OF DAILY LIVING (ADL)
ADLS_ACUITY_SCORE: 18
CHANGE_IN_FUNCTIONAL_STATUS_SINCE_ONSET_OF_CURRENT_ILLNESS/INJURY: NO
ADLS_ACUITY_SCORE: 18

## 2022-11-15 NOTE — PLAN OF CARE
Pt aox4. Last CIWA 9 (tremor, headache, nausea). Independent in room. On room air. L PIV running at 125ml. Vital signs stable. Slept through night

## 2022-11-15 NOTE — ED NOTES
Pt states his nausea is getting better but is still feeling shaky.  Pt MSSA done and Pt given PO med for withdrawal. Pt being monitored for effect.

## 2022-11-15 NOTE — H&P
[facility]    History and Physical - [facility]       Date of Admission:  11/14/2022    Assessment & Plan      Brenden Bishop is a 27 year old male with a history of AUD, recreational cannibis use, alcohol-induced acute pancreatitis, and MDD/HUNTER admitted for medical stabilization and alcohol detox.    # AUD  # acute alcohol withdrawal  # autonomic instability (tachycardia, tremor, sweating)  Drinks 1 L vodka/day, recently on a multi-day binge. Per chart review, his last alcohol-related hospitalization was 10/6-10/7. Denies h/o alcohol withdrawal seizures or DTs.  Does endorse some chest pain however he attributes it to vomiting. No personal or family history of cardiac conditions. PAWSS score 7, lactate wnl without signs of infection. Tachycardia likely secondary to acute alcohol withdrawal however per chart review, he does not appear to have been this tachycardic during prior withdrawals. It is reassuring that his heart rate has improved with IV fluids and nausea treatment however we will continue to monitor closely.  - EKG pending  - Lipase pending  - q4h vitals  - Zofran 4mg oral or IV q6h  prn, Compazine 5-10mg oral or IV q6h prn  - Clonidine 0.1mg oral q8h; hold SBP < 90mmHg  - Gabapentin taper; 1200mg oral 11/14  - CIWA-Ar scoring with Diazepam symptom triggered dosing  - continuous cardiac monitoring  - regular adult diet  - Melatonin 1mg at bedtime prn  - Thiamine 100mg daily 11/14-11/18  - Folic acid 1mg oral daily  - Multivitamin 1 tablet oral daily  - TUMS 500mg BID prn for heartburn  - consider social work consult once patient is less acutely ill    # hypokalemia  # anion gap metabolic acidosis  Admission K 3.3. Hypokalemia likely 2/2 GI losses. Would expect metabolic alkalosis with vomiting; ketones negative ruling out alcoholic ketoacidosis.  - high K replacement RN protocol d/t ongoing losses  - NS @ 125mL/hr  - daily CMP    #  Leukocytosis  # Thrombocytosis  Likely secondary to  acute vomiting. Lactate wnl. Low concern for infection.  - Daily CBC    # Hypomagnesemia  # Hypophosphatemia  Mag 1.3, Phos 1.8. Likely 2/2 malnutrition.  - Mag, Phos RN replacement protocol  - daily CBC    # echinocytes present  No known kidney or liver disease, not drug-induced. Could be 2/2 hypophosphatemia vs. storage artifact.  - repeat peripheral blood smear 11/15    Chronic/Stable:   # MDD/HUNTER: PTA Seroquel 25mg at bedtime, PTA Baclofen 20mg TID       Diet: Combination Diet Regular Diet Adult    DVT Prophylaxis: Pneumatic Compression Devices and Ambulate every shift  [place] Catheter: Not present  Fluids: NS @ 125mL/hr  Central Lines: None  Cardiac Monitoring: ACTIVE order. Indication: tachycardia  Code Status: Full Code      Clinically Significant Risk Factors Present on Admission        # Hypokalemia: Lowest K = 3.3 mmol/L (Ref range: 3.4-5.3) in last 2 days, will replace as needed     # Hypomagnesemia: Lowest Mg = 1.3 mg/dL (Ref range: 1.7-2.3) in last 2 days, will replace as needed  # Anion Gap Metabolic Acidosis: Highest Anion Gap = 20 mmol/L (Ref range: 7-15) in last 2 days, will monitor and treat as appropriate                  Disposition Plan      Expected Discharge Date: 11/16/2022                The patient's care was discussed with the Attending Physician, Dr. Mesa and seen by Dr. Scruggs.    DO [facility]  [facility]  Securely message with the Vocera Web Console (learn more here)  Text page via [facility] Paging/Directory   Please see signed in provider for up to date coverage information    ______________________________________________________________________    Chief Complaint   Alcohol detox    History is obtained from the patient, patient's parents Fiordaliza and Rod, and chart review.    History of Present Illness   Brenden Bishop is a 27 year old male with a  "history of AUD, recreational cannibis use, alcohol-induced acute pancreatitis, and MDD/HUNTER who presented to the ED on 11/14 requesting alcohol detox. He reports drinking 1L vodka daily, last drink was 10pm 11/13/22 and finishing a 3-day binge.  Endorses nausea, vomiting, abdominal pain, chest pain 2/2 post-emesis coughing. Denies recent illnesses, SI, HI, diarrhea, constipation, SOB, other recreational drug use, history of withdrawal seizure or DTs.    Longest period of sobriety was 1 year, ending approximately 1 month ago (hospitalized 10/6-10/7, ED visit 10/13). Reports return to use  is due to worsening depression. He lives with his parents and has an occasional sexual partner, monogamous over the last year.  Denies any concerns for STIs and not interested in HIV or STI testing today.    The only medications he takes are scheduled Baclofen for anxiety and Seroquel for sleep. He does not see a therapist or psychiatrist regularly.  He has been eating over the past couple of days during this drinking binge however has not been able to keep much food down today due to vomiting. Denies any significant personal or family history of cardiac diagnoses.    Per phone discussion with father, Rod, he and his wife have been more concerned about Marshall over the last two weeks as they feel his drinking has escalated, \"we've been waiting for him to hit the bottom.\" He reports that Marshall's typical drinking pattern is consuming alcohol 2-5 days followed by a few weeks of sobriety however they think he has been on a longer diaz than usual. They also feel his mood has been worse. Marshall has spent significant periods of time in multiple inpatient treatment facilities since age 17, most recently [facility] in 2018.     ED Course:  Vitals: -132, -166/, % on RA  Fluids: 1L NS followed by mIVF @ 125mL/hr  Labs: K 3.3, Anion gap 20, AST 46, T bili 1.5, , WBC 18.9, Plt 526, [place] cells present, COVID negative, UDS + " cannabinoids, Alc breath test 0.023, Lactic acid 1.7  Meds: total 25mg IV valium, Klor-con 20meq, 4mg IV Zofran x2  Abx: none  Imaging: none    The patient was admitted to the [facility] inpatient service for workup of tachycardia and tachypnea prior to transfer to alcohol detox unit.     Review of Systems    The 10 point Review of Systems is negative other than noted in the HPI or here.    Past Medical History    I have reviewed this patient's medical history and updated it with pertinent information if needed.   History reviewed. No pertinent past medical history.     Past Surgical History   I have reviewed this patient's surgical history and updated it with pertinent information if needed.  Past Surgical History:   Procedure Laterality Date     ORTHOPEDIC SURGERY      wrist surg      Social History   I have reviewed this patient's social history and updated it with pertinent information if needed. Brenden ROSARIO Bishop  reports that he has quit smoking. He uses smokeless tobacco. He reports current alcohol use. He reports current drug use. Drug: Marijuana.    Family History   No significant family history, including no history of: cardiac diagnoses    Prior to Admission Medications   Prior to Admission Medications   Prescriptions Last Dose Informant Patient Reported? Taking?   QUEtiapine (SEROQUEL) 25 MG tablet Past Week Self Yes Yes   Sig: Take 25 mg by mouth At Bedtime   baclofen (LIORESAL) 20 MG tablet Past Week Self Yes Yes   Sig: Take 20 mg by mouth 3 times daily      Facility-Administered Medications: None     Allergies   No Known Allergies    Physical Exam   Vital Signs: Temp: 98  F (36.7  C) Temp src: Oral BP: (!) 130/104 Pulse: 103   Resp: 16 SpO2: 98 % O2 Device: None (Room air)    Weight: 158 lbs 0 oz    Constitutional: awake, alert and cooperative, uncomfortable appearing and vomiting multiple times while this writer was in the room  Respiratory: No increased work of breathing, good air exchange,  clear to auscultation bilaterally, no crackles or wheezing  Cardiovascular: regular rhythm, tachycardic to 118, normal S1 and S2, no S3 or S4  GI: mild tenderness to palpation over epigastric area  Skin: warm, diaphoretic, no rashes on exposed skin  Neurologic: Awake, alert, oriented to name, place and time. Cranial nerves II-XII are grossly intact. Mild tremor noted in bilateral UE    Data   Data reviewed today: I reviewed all medications, new labs and imaging results over the last 24 hours. I personally reviewed no images or EKG's today.    Recent Labs   Lab 11/14/22  1835 11/14/22  1353   WBC  --  18.9*   HGB  --  14.9   MCV  --  95   PLT  --  526*   NA  --  140   POTASSIUM  --  3.3*   CHLORIDE  --  94   CO2  --  26   BUN  --  11   CR  --  0.58*   ANIONGAP  --  20*   REBA  --  9.4   GLC  --  185*   ALBUMIN  --  4.0   PROTTOTAL  --  8.0   BILITOTAL  --  1.5*   ALKPHOS  --  141   ALT  --  39   AST  --  46*   LIPASE 635*  --

## 2022-11-15 NOTE — PLAN OF CARE
"Goal Outcome Evaluation:        Patient is alert and oriented x4. Able to make needs known. On RA stable. Denies numbness/tingling or CP. Zofran for nausea with minimal effect. Pt up independent in room. PIV L arm infusing NS at 125 mL/hr. Diarrhea X2 this AM. Voiding spontaneously with no difficulty. Ate 75% of meals but c/o of abdominal discomfort after eating. Skin CDI. Electrolytes replaced; Potassium Phosphate infusing currently. Elevated BP, provider aware. valium given per CIWA scores. Bed in low position. Call light within reach. Continue with POC.     BP (!) 152/107 (BP Location: Right arm)   Pulse 105   Temp 99.2  F (37.3  C) (Oral)   Resp 18   Ht 1.778 m (5' 10\")   Wt 71.7 kg (158 lb)   SpO2 96%   BMI 22.67 kg/m                         "

## 2022-11-15 NOTE — ED NOTES
When first going to give the Pt his PO med the Pt was had just had a large emesis. Pt was allowed to let his stomach rest for a bit since he had already gotten zofran.  Pt stated he was feeling better and given PO med.  Pt being monitored for effect.

## 2022-11-15 NOTE — PROGRESS NOTES
[facility]    Family Medicine Progress Note - [place]'s Service       Main Plans for Today   - Continue CIWA protocol with Diazepam; last drink 2200 11/13   - Continue MCV, IV thiamine   - Chem Dep and Nutrition Consults   - Start Pepcid for suspected alcohol gastritis   - Continue  mL/hr; may discontinue 11/15PM if tolerating PO      Assessment & Plan     Brenden Bishop is a 27 year old male PMH AUD, recreational cannibis use, alcohol-induced acute pancreatitis, and MDD/HUNTER admitted 11/15/22 for alcohol detoxification and acute withdrawal. Last drink 2200 11/13. No history of alcohol withdrawal seizures/DT's.      # Alcohol use disorder   # Acute alcohol withdrawal  Patient reports drinking 1L vodka daily PTA. Last drink 11/13 2200. No history of alcohol withdrawal seizures/DT's. Admitted with abdominal pain, N/V. Initially with tachycardia and hypertension- improved on CIWA protocol. Interested in outpatient treatment; previously sober for one year but had recurrence of disease 10/2022 due to worsening depression.   - Chem Dep and Nutrition consults placed; appreciate recommendations   - Continue CIWA protocol with Diazepam; plan to keep CIWA for 72-hours or if scoring low/not requiring benzodiazepines for 24-hours   - Continue Clonidine 0.1mg oral q8h; hold SBP < 90mmHg  - Continue Gabapentin taper; 1200mg oral 11/14  - Continue NS 125mL/hr; can discontinue once patient tolerating PO  - Nausea: Zofran 4mg oral or IV q6h  prn, Compazine 5-10mg oral or IV q6h prn  - Melatonin 1mg at bedtime prn  - Thiamine 100mg daily 11/14-11/18  - Folic acid 1mg oral daily  - Multivitamin 1 tablet oral daily  - Discontinue cardiac telemetry given improvement in autonomic instability   - Vitals q8hr     # Abdominal pain, epigastric   # History of alcohol-induced pancreatitis   Patient reported epigastric abdominal pain without radiation to back. Lipase 2x ULN. Pain improved  11/16. Does not meet criteria for acute pancreatitis given nature of pain and lipase level. Suspect alcohol gastritis.   - Start Pepcid 20 mg BID   - TUMS 500 mg BID PRN   - If having worsening pain, could consider repeat lipase vs. imaging to evaluate for pancreatitis     # Hypokalemia  # Hypophosphatemia  # Hypomagnesemia, resolved   # Increased anion gap, resolved   Patient presented with electrolyte abnormalities (K 3.3, Mg 1.3, Ph 1.8) and an increased anion gap (20). Negative ketones reassuring against ketoacidosis. Suspect secondary to malnutrition and vomiting in the setting of AUD. Hypomagnesemia resolved and anion gap closed 11/15. Hypokalemia worsened likely secondary to ongoing losses from vomiting.   - Mg, Phos, and K RN replacement protocols   - Continue NS 125mL/hr; can discontinue once patient tolerating PO  - Daily CMP, Mg, Phos      # Echinocytes present  Noted on admission peripheral smear. Unclear etiology at this time. No known kidney or liver disease. No suspect medications. Could be secondary to hypophosphatemia vs. storage artifact.  - Repeat peripheral blood smear pending      Chronic/Stable/Resolved:   # MDD/HUNTER: PTA Seroquel 25mg at bedtime, PTA Baclofen 20mg TID    # Leukocytosis, resolved   # Thrombocytosis, resolved   Noted on admission (WBC 18.9, ). Resolved 11/15. Likely secondary to acute vomiting. Lactate WNL. Low concern for infection.  - Daily CBC     Diet: Combination Diet Regular Diet Adult  Fluids:  mL/hr  DVT Prophylaxis: Pneumatic Compression Devices and Ambulate every shift  Code Status: Full Code    Disposition Plan   Likely 2-3 days pending out of window for severe withdrawal and Chem Dep consult. Patient interested in outpatient treatment at this time.     The patient's care was discussed with the Attending Physician, Dr. Cantu.    Zarina Sharp MD  [facility]'s Family Medicine  Pager: [phone]  Please see sticky note for cross  cover information    Interval History     Overnight: No acute events since admission.     Subjective: Patient reports feeling tired on evaluation. Endorses nausea- due for antiemetic dose. Overall feels better than yesterday. States abdominal pain is resolved at this time- previously epigastric, worse after eating, not radiating to back. Was able to eat chicken noodle soup last night and keep some of it down. Has not eaten yet this morning. Denies CP/SOB. No SI/HI. Interested in Chem Dep eval. Considering outpatient treatment.     Physical Exam   Vital Signs: Temp: 98.2  F (36.8  C) Temp src: Oral BP: 131/88 Pulse: 86   Resp: 14 SpO2: 94 % O2 Device: None (Room air)    Weight: 158 lbs 0 oz    General: Alert, well-appearing, no acute distress   HEENT: PERRL, moist oral mucus membranes  Pulm: Clear to auscultation bilaterally   CV: RRR, no murmur  Abd: soft, NTND, no masses  Ext: Warm, well perfused, 2+ BL radial pulses, no LE edema  Skin: No rash on limited skin exam  Psych: Mood appropriate to visit content, full affect, rational thought content and process    Data   Recent Labs   Lab 11/15/22  0753 11/14/22  1835 11/14/22  1353   WBC 10.0  --  18.9*   HGB 12.1*  --  14.9   MCV 97  --  95     --  526*     --  140   POTASSIUM 2.8*  --  3.3*   CHLORIDE 101  --  94   CO2 33*  --  26   BUN 12  --  11   CR 0.68  --  0.58*   ANIONGAP 6  --  20*   REBA 8.5  --  9.4   GLC 84  --  185*   ALBUMIN 2.9*  --  4.0   PROTTOTAL 6.0*  --  8.0   BILITOTAL 2.2*  --  1.5*   ALKPHOS 98  --  141   ALT 25  --  39   AST 32  --  46*   LIPASE  --  635*  --      No results found for this or any previous visit (from the past 24 hour(s)).

## 2022-11-15 NOTE — UTILIZATION REVIEW
Admission Status; Secondary Review Determination       Under the authority of the Utilization Management Committee, the utilization review process indicated a secondary review on the above patient. The review outcome is based on review of the medical records, discussions with staff, and applying clinical experience noted on the date of the review.     (x) Inpatient Status Appropriate - This patient's medical care is consistent with medical management for inpatient care and reasonable inpatient medical practice.     RATIONALE FOR DETERMINATION   27 year old male with a history of AUD, recreational cannibis use, alcohol-induced acute pancreatitis, and MDD/HUNTER admitted for medical stabilization and alcohol detox.   Patient requires inpatient admission versus short stay observation or outpatient treatment for the following reasons: Patient very high CIWA score on admission 26 putting her in severe alcohol withdrawal category, requiring both IV and oral benzodiazepine, antipsychotic, very high-dose gabapentin, also has metabolic abnormality with hypokalemia hypomagnesemia hypophosphatemia, potassium despite replacement his lower down to 2.8 this morning clearly requiring ongoing hospital care for multiple days.  Brian SETH, et al. The ASAM Clinical Practice Guideline on Alcohol Withdrawal Management. [Internet] American Society of Addiction Medicine. 2020 Jan Accessed at: https://www.asam.org/. [accessed 2021 Oct 12]   The expected length of stay at the time of admission was more than 2 nights because of the severity of illness, intensity of service provided, and risk for adverse outcome. Inpatient admission is appropriate.         This document was produced using voice recognition software       The information on this document is developed by the utilization review team in order for the business office to ensure compliance. This only denotes the appropriateness of proper admission status and does not reflect the  quality of care rendered.   The definitions of Inpatient Status and Observation Status used in making the determination above are those provided in the CMS Coverage Manual, Chapter 1 and Chapter 6, section 70.4.   Sincerely,   TURNER BERRIOS MD   System Medical Director   Utilization Management   [facility].

## 2022-11-16 LAB
ALBUMIN SERPL-MCNC: 2.9 G/DL (ref 3.4–5)
ALP SERPL-CCNC: 95 U/L (ref 40–150)
ALT SERPL W P-5'-P-CCNC: 29 U/L (ref 0–70)
ANION GAP SERPL CALCULATED.3IONS-SCNC: 8 MMOL/L (ref 3–14)
AST SERPL W P-5'-P-CCNC: 26 U/L (ref 0–45)
BILIRUB SERPL-MCNC: 1.2 MG/DL (ref 0.2–1.3)
BUN SERPL-MCNC: 8 MG/DL (ref 7–30)
CALCIUM SERPL-MCNC: 8.9 MG/DL (ref 8.5–10.1)
CHLORIDE BLD-SCNC: 105 MMOL/L (ref 94–109)
CO2 SERPL-SCNC: 27 MMOL/L (ref 20–32)
CREAT SERPL-MCNC: 0.57 MG/DL (ref 0.66–1.25)
ERYTHROCYTE [DISTWIDTH] IN BLOOD BY AUTOMATED COUNT: 13.3 % (ref 10–15)
GFR SERPL CREATININE-BSD FRML MDRD: >90 ML/MIN/1.73M2
GLUCOSE BLD-MCNC: 102 MG/DL (ref 70–99)
HCT VFR BLD AUTO: 36.1 % (ref 40–53)
HGB BLD-MCNC: 12.4 G/DL (ref 13.3–17.7)
HOLD SPECIMEN: NORMAL
MAGNESIUM SERPL-MCNC: 1.3 MG/DL (ref 1.6–2.3)
MCH RBC QN AUTO: 34.1 PG (ref 26.5–33)
MCHC RBC AUTO-ENTMCNC: 34.3 G/DL (ref 31.5–36.5)
MCV RBC AUTO: 99 FL (ref 78–100)
PHOSPHATE SERPL-MCNC: 2.3 MG/DL (ref 2.5–4.5)
PHOSPHATE SERPL-MCNC: 3.5 MG/DL (ref 2.5–4.5)
PLATELET # BLD AUTO: 289 10E3/UL (ref 150–450)
POTASSIUM BLD-SCNC: 3.8 MMOL/L (ref 3.4–5.3)
PROT SERPL-MCNC: 6.3 G/DL (ref 6.8–8.8)
RBC # BLD AUTO: 3.64 10E6/UL (ref 4.4–5.9)
SODIUM SERPL-SCNC: 140 MMOL/L (ref 133–144)
WBC # BLD AUTO: 7.8 10E3/UL (ref 4–11)

## 2022-11-16 PROCEDURE — 83735 ASSAY OF MAGNESIUM: CPT | Performed by: STUDENT IN AN ORGANIZED HEALTH CARE EDUCATION/TRAINING PROGRAM

## 2022-11-16 PROCEDURE — 258N000003 HC RX IP 258 OP 636

## 2022-11-16 PROCEDURE — 250N000013 HC RX MED GY IP 250 OP 250 PS 637

## 2022-11-16 PROCEDURE — 85027 COMPLETE CBC AUTOMATED: CPT | Performed by: STUDENT IN AN ORGANIZED HEALTH CARE EDUCATION/TRAINING PROGRAM

## 2022-11-16 PROCEDURE — 120N000002 HC R&B MED SURG/OB UMMC

## 2022-11-16 PROCEDURE — 36415 COLL VENOUS BLD VENIPUNCTURE: CPT

## 2022-11-16 PROCEDURE — 250N000013 HC RX MED GY IP 250 OP 250 PS 637: Performed by: STUDENT IN AN ORGANIZED HEALTH CARE EDUCATION/TRAINING PROGRAM

## 2022-11-16 PROCEDURE — H0001 ALCOHOL AND/OR DRUG ASSESS: HCPCS

## 2022-11-16 PROCEDURE — 84100 ASSAY OF PHOSPHORUS: CPT

## 2022-11-16 PROCEDURE — 250N000009 HC RX 250

## 2022-11-16 PROCEDURE — 258N000003 HC RX IP 258 OP 636: Performed by: STUDENT IN AN ORGANIZED HEALTH CARE EDUCATION/TRAINING PROGRAM

## 2022-11-16 PROCEDURE — 84100 ASSAY OF PHOSPHORUS: CPT | Performed by: FAMILY MEDICINE

## 2022-11-16 PROCEDURE — 36415 COLL VENOUS BLD VENIPUNCTURE: CPT | Performed by: STUDENT IN AN ORGANIZED HEALTH CARE EDUCATION/TRAINING PROGRAM

## 2022-11-16 PROCEDURE — 250N000011 HC RX IP 250 OP 636: Performed by: STUDENT IN AN ORGANIZED HEALTH CARE EDUCATION/TRAINING PROGRAM

## 2022-11-16 PROCEDURE — 82947 ASSAY GLUCOSE BLOOD QUANT: CPT | Performed by: STUDENT IN AN ORGANIZED HEALTH CARE EDUCATION/TRAINING PROGRAM

## 2022-11-16 PROCEDURE — 250N000013 HC RX MED GY IP 250 OP 250 PS 637: Performed by: FAMILY MEDICINE

## 2022-11-16 PROCEDURE — 250N000011 HC RX IP 250 OP 636

## 2022-11-16 RX ORDER — MAGNESIUM SULFATE HEPTAHYDRATE 40 MG/ML
4 INJECTION, SOLUTION INTRAVENOUS ONCE
Status: COMPLETED | OUTPATIENT
Start: 2022-11-16 | End: 2022-11-16

## 2022-11-16 RX ORDER — POTASSIUM CHLORIDE 1.5 G/1.58G
20 POWDER, FOR SOLUTION ORAL ONCE
Status: COMPLETED | OUTPATIENT
Start: 2022-11-16 | End: 2022-11-16

## 2022-11-16 RX ORDER — HYDROXYZINE HYDROCHLORIDE 25 MG/1
50 TABLET, FILM COATED ORAL EVERY 6 HOURS PRN
Status: DISCONTINUED | OUTPATIENT
Start: 2022-11-16 | End: 2022-11-17 | Stop reason: HOSPADM

## 2022-11-16 RX ORDER — HYDROXYZINE HYDROCHLORIDE 25 MG/1
25 TABLET, FILM COATED ORAL EVERY 6 HOURS PRN
Status: DISCONTINUED | OUTPATIENT
Start: 2022-11-16 | End: 2022-11-17 | Stop reason: HOSPADM

## 2022-11-16 RX ADMIN — THIAMINE HYDROCHLORIDE 500 MG: 100 INJECTION, SOLUTION INTRAMUSCULAR; INTRAVENOUS at 21:04

## 2022-11-16 RX ADMIN — FAMOTIDINE 10 MG: 10 TABLET ORAL at 05:07

## 2022-11-16 RX ADMIN — POTASSIUM & SODIUM PHOSPHATES POWDER PACK 280-160-250 MG 2 PACKET: 280-160-250 PACK at 01:13

## 2022-11-16 RX ADMIN — HYDROXYZINE HYDROCHLORIDE 25 MG: 25 TABLET, FILM COATED ORAL at 09:20

## 2022-11-16 RX ADMIN — FOLIC ACID 1 MG: 1 TABLET ORAL at 08:27

## 2022-11-16 RX ADMIN — QUETIAPINE FUMARATE 25 MG: 25 TABLET ORAL at 21:02

## 2022-11-16 RX ADMIN — GABAPENTIN 900 MG: 300 CAPSULE ORAL at 20:57

## 2022-11-16 RX ADMIN — CALCIUM CARBONATE (ANTACID) CHEW TAB 500 MG 500 MG: 500 CHEW TAB at 23:51

## 2022-11-16 RX ADMIN — MAGNESIUM SULFATE HEPTAHYDRATE 4 G: 40 INJECTION, SOLUTION INTRAVENOUS at 18:17

## 2022-11-16 RX ADMIN — BACLOFEN 20 MG: 20 TABLET ORAL at 15:06

## 2022-11-16 RX ADMIN — CLONIDINE HYDROCHLORIDE 0.1 MG: 0.1 TABLET ORAL at 20:57

## 2022-11-16 RX ADMIN — SODIUM CHLORIDE: 9 INJECTION, SOLUTION INTRAVENOUS at 03:42

## 2022-11-16 RX ADMIN — THIAMINE HYDROCHLORIDE 500 MG: 100 INJECTION, SOLUTION INTRAMUSCULAR; INTRAVENOUS at 09:05

## 2022-11-16 RX ADMIN — BACLOFEN 20 MG: 20 TABLET ORAL at 08:27

## 2022-11-16 RX ADMIN — CLONIDINE HYDROCHLORIDE 0.1 MG: 0.1 TABLET ORAL at 12:05

## 2022-11-16 RX ADMIN — CLONIDINE HYDROCHLORIDE 0.1 MG: 0.1 TABLET ORAL at 04:31

## 2022-11-16 RX ADMIN — POTASSIUM PHOSPHATE, MONOBASIC AND POTASSIUM PHOSPHATE, DIBASIC 15 MMOL: 224; 236 INJECTION, SOLUTION INTRAVENOUS at 12:05

## 2022-11-16 RX ADMIN — THIAMINE HYDROCHLORIDE 500 MG: 100 INJECTION, SOLUTION INTRAMUSCULAR; INTRAVENOUS at 16:31

## 2022-11-16 RX ADMIN — POTASSIUM CHLORIDE 20 MEQ: 1.5 FOR SOLUTION ORAL at 18:17

## 2022-11-16 RX ADMIN — POTASSIUM & SODIUM PHOSPHATES POWDER PACK 280-160-250 MG 2 PACKET: 280-160-250 PACK at 04:30

## 2022-11-16 RX ADMIN — BACLOFEN 20 MG: 20 TABLET ORAL at 20:57

## 2022-11-16 RX ADMIN — POTASSIUM & SODIUM PHOSPHATES POWDER PACK 280-160-250 MG 2 PACKET: 280-160-250 PACK at 08:26

## 2022-11-16 RX ADMIN — GABAPENTIN 900 MG: 300 CAPSULE ORAL at 12:05

## 2022-11-16 RX ADMIN — Medication 1 TABLET: at 08:27

## 2022-11-16 RX ADMIN — GABAPENTIN 900 MG: 300 CAPSULE ORAL at 04:30

## 2022-11-16 ASSESSMENT — ACTIVITIES OF DAILY LIVING (ADL)
ADLS_ACUITY_SCORE: 18

## 2022-11-16 NOTE — PLAN OF CARE
"Goal Outcome Evaluation:        Patient is alert and oriented x4. Able to make needs known. On RA stable. Denies numbness/tingling or CP. Denies nausea. Pt up independent in room. PIV L SL. Voiding spontaneously with no difficulty. Ate 75% of meals. Skin CDI. Electrolytes replaced. Elevated BP better this shift. CIWA scores low this shift. Bed in low position. Call light within reach. Continue with POC.       BP (!) 136/95 (BP Location: Right arm)   Pulse 85   Temp 97.9  F (36.6  C) (Oral)   Resp 18   Ht 1.778 m (5' 10\")   Wt 71.7 kg (158 lb)   SpO2 96%   BMI 22.67 kg/m              "

## 2022-11-16 NOTE — PROGRESS NOTES
CLINICAL NUTRITION SERVICES - ASSESSMENT NOTE     Nutrition Prescription    RECOMMENDATIONS FOR MDs/PROVIDERS TO ORDER:  Continue with supplements as ordered and RN managed electrolyte replacement therapies as pt continues to tolerate increasing amounts of oral intake.     Malnutrition Status:    Patient does not meet two of the established criteria necessary for diagnosing malnutrition but is at risk for malnutrition.     Recommendations already ordered by Registered Dietitian (RD):  -Ensure once daily + PRN     Future/Additional Recommendations:  Monitor lytes, tolerance to PO intake.      REASON FOR ASSESSMENT  Brenden Bishop is a/an 27 year old male assessed by the dietitian for Provider Order - etoh use disorder     PMH  PMH AUD, recreational cannibis use, alcohol-induced acute pancreatitis, and MDD/HUNTER admitted 11/15/22 for alcohol detoxification and acute withdrawal. Last drink 2200 11/13. No history of alcohol withdrawal seizures/DT's.     Patient reported epigastric abdominal pain without radiation to back. Lipase 2x ULN. Pain improved 11/16. Does not meet criteria for acute pancreatitis given nature of pain and lipase level. Suspect alcohol gastritis.     On RN potassium, phosphate, and magnesium replacement protocols; phosphate level of 1.6 and is being managed with Neutra-Phos packets.    NUTRITION HISTORY  Per chart review, pt was able to tolerate intake over the days before admission but was not able to keep much down due to his emesis. Yesterday he was able to eat 75% of meals though did complain of abdominal discomfort after.     Visited with patient at bedside. He endorsed poor PO intake PTA, especially the last 3 days before admission eating only some applesauce and ham sandwiches. He did not endorse nausea/vomiting prior to admission.    He was able to eat a little before yesterday, then yesterday was able to finish all 3 meals with no nausea/vomiting. He was able to finish his breakfast this  "morning. He is sticking to softer, easier to digest foods (juice, yogurt, chicken noodle soup) because he is experiencing some discomfort with swallowing. Encouraged continued intake of soft foods while his tolerance is returning. He reports generally drinking a protein shake in the morning because he is not hungry, then eating at 10AM, 1PM, and in the evening (his Seroquel makes him hungry). Ensure ordered once daily at breakfast and PRN.     We reviewed the vitamins and minerals he is receiving, pt tolerating.     He feels as if he has lost weight over the last week. He reports a UBW of 150-155lbs, though he tends to have fluctuations of 5-10lbs.     CURRENT NUTRITION ORDERS  Diet: Regular  Intake/Tolerance: %     LABS  K+: 2.8 (L. 11/15), corrected now 4.7  PO4: 1.6 (L)   M.3 (, L), 1.6 (, WNL)   Lipase: 635 (H)     MEDICATIONS  Folic acid daily   Thera-Vit-M daily   Thiamin daily   Potassium phosphate 15 mmol in sodium chloride intermittent infusion     ANTHROPOMETRICS  Height: 177.8 cm (5' 10\")  Most Recent Weight: 71.7 kg (158 lb)    IBW: 73 kg  BMI: Normal BMI  Weight History:   Wt Readings from Last 15 Encounters:   22 71.7 kg (158 lb)   11 69.9 kg (154 lb) (60 %, Z= 0.24)*     * Growth percentiles are based on CDC (Boys, 2-20 Years) data.     22: 73.8 kg (162 lb 9.6 oz)     Patient with very limited weight history, though has appeared relatively stable over the last 7 months. Admission wt is a stated wt so difficult to truly assess.     Dosing Weight: 73.8 kg most recent measured weight      ASSESSED NUTRITION NEEDS  Estimated Energy Needs: 8588-0552 kcals/day (25 - 30 kcals/kg)  Justification: Maintenance  Estimated Protein Needs: 60-74 grams protein/day (0.8 - 1 grams of pro/kg)  Justification: Maintenance  Estimated Fluid Needs: 1 mL/kcal  Justification: Maintenance    MALNUTRITION  % Intake: Decreased intake does not meet criteria  % Weight Loss: Unable to " assess  Subcutaneous Fat Loss: None observed  Muscle Loss: None observed  Fluid Accumulation/Edema: None noted  Malnutrition Diagnosis: Patient does not meet two of the established criteria necessary for diagnosing malnutrition but is at risk for malnutrition.     NUTRITION DIAGNOSIS  Inadequate oral intake related to alcoholism/nausea and vomiting as evidenced by reported PO intake not meeting estimated needs.      INTERVENTIONS  Implementation  Nutrition Education: Provided education on role of RD, role of vitamins, importance of adequate PO intake.  Medical food supplement therapy: Ensure once daily + PRN     Goals  Patient to consume % of nutritionally adequate meal trays TID, or the equivalent with supplements/snacks.     Monitoring/Evaluation  Progress toward goals will be monitored and evaluated per protocol.    Nimco Barone, MPH, RDN  6B Pager: [phone]  Weekend/holiday pager: [phone]

## 2022-11-16 NOTE — CONSULTS
11/16/2022    CD consult completed.   Will send pt an email with follow up information and resources, send ROIs to unit SW and make referrals to outpatient treatment. Pt instructed he will need to follow up with referrals, pt verbalized understanding.     Recommendations:   1. Abstain from all non-prescribed mood-altering substances  2. Take all medications as prescribed  3. Enter and complete an IOP treatment program  4. Follow all recommendations upon discharge from treatment. Recommendations may include, but are not limited to: extended treatment, outpatient treatment and/or sober housing.  5. Follow all recommendations of your medical and mental health providers        6.   Attend, at minimum, 2 weekly support group meetings, such as 12 step based (AA/NA), SMART Recovery, Health Realizations, and/or Refuge Recovery meetings.        Clinical Substantiation:  Patient has been unable to maintain abstinence from alcohol while living at his current home environment. Patient lacks long-term sober maintenance skills and lacks a sober peer support network. Patient has dual issues of mental health and substance use, in which his mental health symptoms are exacerbated by his substance use. Patient reports he is thinking about attending outpatient but is open to residential options. Patient reports he binges, denies daily use.      Referrals/Alternatives:  Intensive outpatient treatment programs such as [facility].      KAYLEY consult completed by: Mera Ford [facility].  Phone Number: [phone]  E-mail Address: [email or web address]  [address] [place] [address]     *Due to regulation of Title 42 of the Code of Federal Regulations (CFR) Part 2: Confidentiality laws apply to this note and the information wherein.  Thus, this note cannot be copy and pasted into any other health  care staff's note nor can it be included in general medical records sent to ANY outside agency without the patient's written consent.

## 2022-11-16 NOTE — PROGRESS NOTES
[facility]    Family Medicine Progress Note - [place]'s Service       Main Plans for Today   - Continue CIWA protocol with Diazepam; last drink 2200 11/13   - Continue MCV, IV thiamine   - Discontinued IVF because PO intake is appropriate    - Hydroxyzine 25-50 mg q6hr PRN for anxiety  - Phosphorous HIGH replacement protocol    Assessment & Plan     Brenden Bishop is a 27 year old male PMH AUD, recreational cannibis use, alcohol-induced acute pancreatitis, and MDD/HUNTER admitted 11/15/22 for alcohol detoxification and acute withdrawal. Last drink 2200 11/13. No history of alcohol withdrawal seizures/DT's.      # Alcohol use disorder   # Acute alcohol withdrawal  Patient reports drinking 1L vodka daily PTA. Last drink 11/13 2200. No history of alcohol withdrawal seizures/DT's. Admitted with abdominal pain, N/V. Initially with tachycardia and hypertension- improved on CIWA protocol. Interested in outpatient treatment; previously sober for one year but had recurrence of disease 10/2022 due to worsening depression. Chem dep recs were made for outpatient treatment options.  - Continue CIWA protocol with Diazepam; plan to keep CIWA for 72-hours or if scoring low/not requiring benzodiazepines for 24-hours   - Continue Clonidine 0.1mg oral q8h; hold SBP < 90mmHg  - Continue Gabapentin taper; 1200mg oral 11/14  - Discontinued NS because Brenden is tolerating PO  - Nausea: Zofran 4mg oral or IV q6h prn, Compazine 5-10mg oral or IV q6h prn  - Melatonin 1mg at bedtime prn  - Thiamine 100mg daily 11/14-11/18  - Folic acid 1mg oral daily  - Multivitamin 1 tablet oral daily  - Discontinue cardiac telemetry given improvement in autonomic instability   - Vitals q8hr     # Abdominal pain, epigastric   # History of alcohol-induced pancreatitis   Patient reported epigastric abdominal pain without radiation to back. Lipase 2x ULN. Pain improved 11/16. Does not meet criteria for acute  pancreatitis given nature of pain and lipase level. Suspect alcohol gastritis.   - Start Pepcid 20 mg BID   - TUMS 500 mg BID PRN   - If having worsening pain, could consider repeat lipase vs. imaging to evaluate for pancreatitis     # Hypophosphatemia  # Hypokalemia, resolved  # Hypomagnesemia, resolved   # Increased anion gap, resolved   Patient presented with electrolyte abnormalities (K 3.3, Mg 1.3, Ph 1.8) and an increased anion gap (20). Negative ketones reassuring against ketoacidosis. Suspect secondary to malnutrition and vomiting in the setting of AUD. Hypomagnesemia resolved and anion gap closed 11/15. Hypokalemia resolved on 11/15. Phosphorous is downtrending.  - Switched phosphorous replacement from STANDARD to HIGH   - Mg and K RN replacement protocols   - Discontinued NS because tolerating PO  - Daily CMP, Mg, Phos      # Echinocytes present, resolved  Noted on admission peripheral smear. Likely secondary to phosphorous depletion secondary to refeeding syndrome. A common cause of Meredith cells is ATP depletion, which can develop from low phosphorous levels.      Chronic/Stable/Resolved:   # MDD/HUNTER: PTA Seroquel 25mg at bedtime, PTA Baclofen 20mg TID  - Hydroxyzine 25-50 mg q6hr PRN for breakthrough anxiety    # Leukocytosis, resolved   # Thrombocytosis, resolved   Noted on admission (WBC 18.9, ). Resolved 11/15. Likely secondary to acute vomiting. Lactate WNL. Low concern for infection.  - Daily CBC     Diet: Combination Diet Regular Diet Adult  Snacks/Supplements Adult: Ensure Enlive; With Meals  Fluids: PO  DVT Prophylaxis: Pneumatic Compression Devices and Ambulate every shift  Code Status: Full Code    Disposition Plan   Likely 2-3 days pending out of window for severe withdrawal and Chem Dep consult. Patient interested in outpatient treatment at this time.     The patient's care was discussed with the Attending Physician, Dr. Cantu.    Nathaly Swain DO, MPH  [facility]  [facility]  Pager: [phone]  Please see sticky note for cross cover information    Interval History     Overnight: No acute events.    Subjective: Patient is feeling better. Notes mild tremor in his hands, but his symptoms have markedly improved. He was able to tolerate all 3 of his meals yesterday. Per nurse, Brenden experienced some anxiety this morning. Denies nausea, vomiting, diarrhea, hallucinations.    Physical Exam   Vital Signs: Temp: 97.7  F (36.5  C) Temp src: Oral BP: (!) 134/103 Pulse: 83   Resp: 18 SpO2: 95 % O2 Device: None (Room air)    Weight: 158 lbs 0 oz    General: Alert, well-appearing, no acute distress   HEENT: PERRL, moist oral mucus membranes  Pulm: Clear to auscultation bilaterally   CV: RRR, no murmur  Abd: soft, mild tenderness to palpation over epigastrium, no masses  Ext: Warm, well perfused, 2+ BL radial pulses, no LE edema, mild tremor in bilateral hands  Skin: No rash on limited skin exam  Psych: Mood appropriate to visit content, full affect, rational thought content and process    Data   Recent Labs   Lab 11/16/22  0939 11/15/22  1857 11/15/22  0753 11/14/22  1835 11/14/22  1353   WBC 7.8  --  10.0  --  18.9*   HGB 12.4*  --  12.1*  --  14.9   MCV 99  --  97  --  95     --  310  --  526*     --  140  --  140   POTASSIUM 3.8 4.7 2.8*  --  3.3*   CHLORIDE 105  --  101  --  94   CO2 27  --  33*  --  26   BUN 8  --  12  --  11   CR 0.57*  --  0.68  --  0.58*   ANIONGAP 8  --  6  --  20*   REBA 8.9  --  8.5  --  9.4   *  --  84  --  185*   ALBUMIN 2.9*  --  2.9*  --  4.0   PROTTOTAL 6.3*  --  6.0*  --  8.0   BILITOTAL 1.2  --  2.2*  --  1.5*   ALKPHOS 95  --  98  --  141   ALT 29  --  25  --  39   AST 26  --  32  --  46*   LIPASE  --   --   --  635*  --      No results found for this or any previous visit (from the past 24 hour(s)).
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
## 2022-11-16 NOTE — DISCHARGE SUMMARY
[facility]  Discharge Summary - Medicine & Pediatrics       Date of Admission:  11/14/2022  Date of Discharge:  11/17/2022 12:40 PM  Discharging Provider: Dr. Rashid Cantu  Discharge Service: [facility] Medicine Service    Discharge Diagnoses   # Alcohol use disorder   # Acute alcohol withdrawal  # Abdominal pain, epigastric   # History of alcohol-induced pancreatitis   # MDD/HUNTER  # Hypophosphatemia  # Hypokalemia, resolved  # Hypomagnesemia, resolved   # Increased anion gap, resolved   # Echinocytes present, resolved  # Leukocytosis, resolved   # Thrombocytosis, resolved     Follow-ups Needed After Discharge    Follow up with PCP, [facility], within 7 days for hospital follow-up.    Unresulted Labs Ordered in the Past 30 Days of this Admission     No orders found from 10/15/2022 to 11/15/2022.          Discharge Disposition   Discharged to home. He begins outpatient treatment on Monday 11/21 with [facility].  Condition at discharge: Stable    Hospital Course   Brenden Bishop is a 27 year old male PMH AUD, recreational cannibis use, alcohol-induced acute pancreatitis, and MDD/HUNTER admitted 11/15/22 for alcohol detoxification and acute withdrawal. Last drink 2200 11/13. No history of alcohol withdrawal seizures/DT's.      # Alcohol use disorder   # Acute alcohol withdrawal  Patient reports drinking 1L vodka daily PTA. Last drink 11/13 2200. No history of alcohol withdrawal seizures/DT's. Admitted with abdominal pain, N/V. Initially with tachycardia and hypertension- improved on CIWA protocol. Interested in outpatient treatment; previously sober for one year but had recurrence of disease 10/2022 due to worsening depression. CIWA protocols followed for 72 hours. Chem dep recommends to enter and complete an IOP treatment program, follow recommendations upon discharge from treatment such as extended or outpatient treatment, and to  attend 2 weekly support group meetings. Discharged with gabapentin 300 mg TID and naltrexone 50 mg daily. He is going home to stay with his parents. He begins outpatient treatment on Monday 11/21 with [facility].     # Abdominal pain, epigastric   # History of alcohol-induced pancreatitis   Patient reported epigastric abdominal pain without radiation to back. Lipase 2x ULN. Pain improved 11/16. Does not meet criteria for acute pancreatitis given nature of pain and lipase level. Suspect alcohol gastritis. Pepcid 20 mg BID and TUMS 500 mg BID PRN.      # Hypophosphatemia  # Hypokalemia, resolved  # Hypomagnesemia, resolved   # Increased anion gap, resolved   Patient presented with electrolyte abnormalities (K 3.3, Mg 1.3, Ph 1.8) and an increased anion gap (20). Negative ketones reassuring against ketoacidosis. Suspect secondary to malnutrition and vomiting in the setting of AUD. Hypomagnesemia resolved and anion gap closed 11/15. Hypokalemia resolved on 11/15. Phosphorous, Mg, and K within normal limits.     # Echinocytes present, resolved  Noted on admission peripheral smear. Likely secondary to phosphorous depletion secondary to refeeding syndrome. A common cause of [place] cells is ATP depletion, which can develop from low phosphorous levels. This has since resolved.     Chronic/Stable/Resolved:   # MDD/HUNTER: PTA Seroquel 25mg at bedtime, PTA Baclofen 20mg TID     # Leukocytosis, resolved   # Thrombocytosis, resolved   Noted on admission (WBC 18.9, ). Resolved 11/15. Likely secondary to acute vomiting. Lactate WNL. Low concern for infection.    Consultations This Hospital Stay   CHEMICAL DEPENDENCY IP CONSULT  NUTRITION SERVICES ADULT IP CONSULT    Code Status   Prior     The patient was discussed with Dr. Cantu.    Nathaly Swain DO, MPH  [facility] Family Medicine Service  [facility] MED SURG  [address] [address]  Phone: [phone]  Fax:  [phone]  ______________________________________________________________________    Physical Exam   Vital Signs: Temp: 98.7  F (37.1  C) Temp src: Oral BP: (!) 153/102 Pulse: 88   Resp: 18 SpO2: 96 % O2 Device: None (Room air)    Weight: 158 lbs 0 oz     General: Alert, well-appearing, no acute distress   HEENT: PERRL, moist oral mucus membranes  Pulm: Clear to auscultation bilaterally   CV: RRR, no murmur  Abd: soft, mild tenderness to palpation over epigastrium, no masses  Ext: Warm, well perfused, 2+ BL radial pulses, no LE edema, mild tremor in bilateral hands  Skin: No rash on limited skin exam  Psych: Mood appropriate to visit content, full affect, rational thought content and process      Primary Care Physician   [facility] [place]    Discharge Orders      Activity    Your activity upon discharge: activity as tolerated     Reason for your hospital stay    You were admitted to the hospital to manage alcohol withdrawal. You are doing much better now, and outpatient treatment has been scheduled.     Adult [facility] Follow-up and recommended labs and tests    Follow up with primary care provider, [facility] Joc[place], within 7 days for hospital follow- up.  No follow up labs or test are needed.      Appointments on [place] and/or [facility] (with [facility] or [facility] provider or service). Call [phone] if you haven't heard regarding these appointments within 7 days of discharge.     Diet    Follow this diet upon discharge: Orders Placed This Encounter      Snacks/Supplements Adult: Ensure Enlive; With Meals      Combination Diet Regular Diet Adult       Significant Results and Procedures   Most Recent 3 CBC's:Recent Labs   Lab Test 11/17/22  1129 11/16/22  0939 11/15/22  0753   WBC 11.9* 7.8 10.0   HGB 13.8 12.4* 12.1*   * 99 97    289 310     Most Recent 3 BMP's:Recent Labs   Lab Test 11/17/22  1129 11/16/22  0939 11/15/22  1857 11/15/22  0753    140  --  140    POTASSIUM 4.8 3.8 4.7 2.8*   CHLORIDE 105 105  --  101   CO2 29 27  --  33*   BUN 12 8  --  12   CR 0.65* 0.57*  --  0.68   ANIONGAP 6 8  --  6   REBA 9.1 8.9  --  8.5   GLC 94 102*  --  84     Most Recent 2 LFT's:Recent Labs   Lab Test 11/17/22  1129 11/16/22  0939   AST 37 26   ALT 37 29   ALKPHOS 112 95   BILITOTAL 0.9 1.2       Discharge Medications   Discharge Medication List as of 11/17/2022 12:03 PM      START taking these medications    Details   gabapentin (NEURONTIN) 300 MG capsule Take 1 capsule (300 mg) by mouth 3 times daily, Disp-90 capsule, R-3, E-Prescribe      naltrexone (DEPADE/REVIA) 50 MG tablet Take 1 tablet (50 mg) by mouth daily, Disp-30 tablet, R-3, E-Prescribe         CONTINUE these medications which have CHANGED    Details   baclofen (LIORESAL) 20 MG tablet Take 1 tablet (20 mg) by mouth 3 times daily, Disp-90 tablet, R-3, E-Prescribe      QUEtiapine (SEROQUEL) 25 MG tablet Take 1 tablet (25 mg) by mouth At Bedtime, Disp-90 tablet, R-3, E-Prescribe           Allergies   No Known Allergies

## 2022-11-16 NOTE — PLAN OF CARE
A/Ox 4. Able to make needs known. On RA with sats >90%. Denies N/T, SOB, and CP. Continent of B/B. LBM 11/15/2022 per pt report. L PIV patent and infusing with normal saline running @125mL/ hr. Independent in room, up ad abdoulaye to the bathroom and voids without difficulty. No pain reported on this shift. CIWA score of 4, 5, and 6. On RN potassium, phosphate, and magnesium replacement protocols; phosphate level of 1.6 and is being managed with Neutra-Phos packets. Appears to be sleeping during rounds. Call light within reach. Continue with POC.

## 2022-11-16 NOTE — PLAN OF CARE
Goal Outcome Evaluation:      Plan of Care Reviewed With: patient    Overall Patient Progress: improvingOverall Patient Progress: improving    Outcome Evaluation: Visited with patient at bedside. He reports tolerating PO intake yesterday and today, generally able to finish 3 meals. Ensure ordered once daily + PRN. See 11/16 note for full assessment.    Nimco Barone, MPH, RDN  6B Pager: [phone]  Weekend/holiday pager: [phone]

## 2022-11-17 VITALS
TEMPERATURE: 98.7 F | HEART RATE: 88 BPM | WEIGHT: 158 LBS | RESPIRATION RATE: 18 BRPM | BODY MASS INDEX: 22.62 KG/M2 | SYSTOLIC BLOOD PRESSURE: 153 MMHG | HEIGHT: 70 IN | OXYGEN SATURATION: 96 % | DIASTOLIC BLOOD PRESSURE: 102 MMHG

## 2022-11-17 PROBLEM — F43.10 PTSD (POST-TRAUMATIC STRESS DISORDER): Status: ACTIVE | Noted: 2017-01-20

## 2022-11-17 PROBLEM — F15.20 AMPHETAMINE USE DISORDER, SEVERE, DEPENDENCE (H): Status: ACTIVE | Noted: 2022-03-25

## 2022-11-17 PROBLEM — K85.20 ALCOHOL-INDUCED ACUTE PANCREATITIS: Status: ACTIVE | Noted: 2022-06-21

## 2022-11-17 LAB
ALBUMIN SERPL-MCNC: 3.4 G/DL (ref 3.4–5)
ALP SERPL-CCNC: 112 U/L (ref 40–150)
ALT SERPL W P-5'-P-CCNC: 37 U/L (ref 0–70)
ANION GAP SERPL CALCULATED.3IONS-SCNC: 6 MMOL/L (ref 3–14)
AST SERPL W P-5'-P-CCNC: 37 U/L (ref 0–45)
BILIRUB SERPL-MCNC: 0.9 MG/DL (ref 0.2–1.3)
BUN SERPL-MCNC: 12 MG/DL (ref 7–30)
CALCIUM SERPL-MCNC: 9.1 MG/DL (ref 8.5–10.1)
CHLORIDE BLD-SCNC: 105 MMOL/L (ref 94–109)
CO2 SERPL-SCNC: 29 MMOL/L (ref 20–32)
CREAT SERPL-MCNC: 0.65 MG/DL (ref 0.66–1.25)
ERYTHROCYTE [DISTWIDTH] IN BLOOD BY AUTOMATED COUNT: 13.3 % (ref 10–15)
GFR SERPL CREATININE-BSD FRML MDRD: >90 ML/MIN/1.73M2
GLUCOSE BLD-MCNC: 94 MG/DL (ref 70–99)
HCT VFR BLD AUTO: 41 % (ref 40–53)
HGB BLD-MCNC: 13.8 G/DL (ref 13.3–17.7)
MAGNESIUM SERPL-MCNC: 2.1 MG/DL (ref 1.6–2.3)
MAGNESIUM SERPL-MCNC: 2.4 MG/DL (ref 1.6–2.3)
MCH RBC QN AUTO: 33.9 PG (ref 26.5–33)
MCHC RBC AUTO-ENTMCNC: 33.7 G/DL (ref 31.5–36.5)
MCV RBC AUTO: 101 FL (ref 78–100)
PHOSPHATE SERPL-MCNC: 2.8 MG/DL (ref 2.5–4.5)
PLATELET # BLD AUTO: 334 10E3/UL (ref 150–450)
POTASSIUM BLD-SCNC: 4.8 MMOL/L (ref 3.4–5.3)
PROT SERPL-MCNC: 7.3 G/DL (ref 6.8–8.8)
RBC # BLD AUTO: 4.07 10E6/UL (ref 4.4–5.9)
SODIUM SERPL-SCNC: 140 MMOL/L (ref 133–144)
WBC # BLD AUTO: 11.9 10E3/UL (ref 4–11)

## 2022-11-17 PROCEDURE — 250N000013 HC RX MED GY IP 250 OP 250 PS 637: Performed by: STUDENT IN AN ORGANIZED HEALTH CARE EDUCATION/TRAINING PROGRAM

## 2022-11-17 PROCEDURE — 250N000011 HC RX IP 250 OP 636: Performed by: STUDENT IN AN ORGANIZED HEALTH CARE EDUCATION/TRAINING PROGRAM

## 2022-11-17 PROCEDURE — 36415 COLL VENOUS BLD VENIPUNCTURE: CPT | Performed by: STUDENT IN AN ORGANIZED HEALTH CARE EDUCATION/TRAINING PROGRAM

## 2022-11-17 PROCEDURE — 84100 ASSAY OF PHOSPHORUS: CPT | Performed by: STUDENT IN AN ORGANIZED HEALTH CARE EDUCATION/TRAINING PROGRAM

## 2022-11-17 PROCEDURE — 36415 COLL VENOUS BLD VENIPUNCTURE: CPT

## 2022-11-17 PROCEDURE — 83735 ASSAY OF MAGNESIUM: CPT | Performed by: STUDENT IN AN ORGANIZED HEALTH CARE EDUCATION/TRAINING PROGRAM

## 2022-11-17 PROCEDURE — 85027 COMPLETE CBC AUTOMATED: CPT | Performed by: STUDENT IN AN ORGANIZED HEALTH CARE EDUCATION/TRAINING PROGRAM

## 2022-11-17 PROCEDURE — 80053 COMPREHEN METABOLIC PANEL: CPT | Performed by: STUDENT IN AN ORGANIZED HEALTH CARE EDUCATION/TRAINING PROGRAM

## 2022-11-17 PROCEDURE — 82040 ASSAY OF SERUM ALBUMIN: CPT | Performed by: STUDENT IN AN ORGANIZED HEALTH CARE EDUCATION/TRAINING PROGRAM

## 2022-11-17 PROCEDURE — 258N000003 HC RX IP 258 OP 636: Performed by: STUDENT IN AN ORGANIZED HEALTH CARE EDUCATION/TRAINING PROGRAM

## 2022-11-17 PROCEDURE — 99239 HOSP IP/OBS DSCHRG MGMT >30: CPT | Mod: GC | Performed by: FAMILY MEDICINE

## 2022-11-17 PROCEDURE — 83735 ASSAY OF MAGNESIUM: CPT

## 2022-11-17 RX ORDER — CALCIUM CARBONATE 500 MG/1
2 TABLET, CHEWABLE ORAL DAILY
Qty: 60 TABLET | Refills: 3 | Status: CANCELLED | OUTPATIENT
Start: 2022-11-17

## 2022-11-17 RX ORDER — NALTREXONE HYDROCHLORIDE 50 MG/1
50 TABLET, FILM COATED ORAL DAILY
Qty: 30 TABLET | Refills: 3 | Status: SHIPPED | OUTPATIENT
Start: 2022-11-17 | End: 2023-09-17

## 2022-11-17 RX ORDER — NALTREXONE HYDROCHLORIDE 50 MG/1
50 TABLET, FILM COATED ORAL DAILY
Qty: 90 TABLET | Refills: 3 | Status: CANCELLED | OUTPATIENT
Start: 2022-11-17

## 2022-11-17 RX ORDER — BACLOFEN 20 MG/1
20 TABLET ORAL 3 TIMES DAILY
Qty: 90 TABLET | Refills: 3 | Status: SHIPPED | OUTPATIENT
Start: 2022-11-17

## 2022-11-17 RX ORDER — FAMOTIDINE 10 MG
10 TABLET ORAL 2 TIMES DAILY PRN
Qty: 60 TABLET | Refills: 1 | Status: CANCELLED | OUTPATIENT
Start: 2022-11-17

## 2022-11-17 RX ORDER — GABAPENTIN 300 MG/1
300 CAPSULE ORAL 3 TIMES DAILY
Qty: 90 CAPSULE | Refills: 3 | Status: SHIPPED | OUTPATIENT
Start: 2022-11-17 | End: 2023-09-17

## 2022-11-17 RX ORDER — HYDROXYZINE HYDROCHLORIDE 25 MG/1
25 TABLET, FILM COATED ORAL EVERY 6 HOURS PRN
Qty: 30 TABLET | Refills: 3 | Status: CANCELLED | OUTPATIENT
Start: 2022-11-17

## 2022-11-17 RX ORDER — QUETIAPINE FUMARATE 25 MG/1
25 TABLET, FILM COATED ORAL AT BEDTIME
Qty: 90 TABLET | Refills: 3 | Status: SHIPPED | OUTPATIENT
Start: 2022-11-17

## 2022-11-17 RX ORDER — FOLIC ACID 1 MG/1
1 TABLET ORAL DAILY
Qty: 90 TABLET | Refills: 3 | Status: CANCELLED | OUTPATIENT
Start: 2022-11-18

## 2022-11-17 RX ADMIN — FOLIC ACID 1 MG: 1 TABLET ORAL at 09:05

## 2022-11-17 RX ADMIN — THIAMINE HYDROCHLORIDE 250 MG: 100 INJECTION, SOLUTION INTRAMUSCULAR; INTRAVENOUS at 09:05

## 2022-11-17 RX ADMIN — CLONIDINE HYDROCHLORIDE 0.1 MG: 0.1 TABLET ORAL at 05:16

## 2022-11-17 RX ADMIN — Medication 1 TABLET: at 09:05

## 2022-11-17 RX ADMIN — GABAPENTIN 900 MG: 300 CAPSULE ORAL at 05:16

## 2022-11-17 RX ADMIN — BACLOFEN 20 MG: 20 TABLET ORAL at 09:05

## 2022-11-17 RX ADMIN — GABAPENTIN 900 MG: 300 CAPSULE ORAL at 11:51

## 2022-11-17 RX ADMIN — CLONIDINE HYDROCHLORIDE 0.1 MG: 0.1 TABLET ORAL at 11:51

## 2022-11-17 ASSESSMENT — ACTIVITIES OF DAILY LIVING (ADL)
ADLS_ACUITY_SCORE: 18

## 2022-11-17 NOTE — PROGRESS NOTES
6MS DISCHARGE    D: Patient discharged to home at 1250. Patient accompanied by father.    I: Discharge prescriptions given to patient. All discharge medications and instructions reviewed with patient. Patient instructed to seek care if experiencing worsening symptoms. Other phone numbers to call with questions or concerns after discharge reviewed. PIV removed. Education completed.    A: Patient verbalized understanding of discharge medications and instructions. Prescribed home medications given to patient.  Belongings returned to patient.    P: Patient to inpatient care at [facility] starting 11/21.

## 2022-11-17 NOTE — PROGRESS NOTES
11/17/2022    Pt left a  for writer yesterday reporting he had a question about treatment. Writer called back this morning, pt stated he had it all figured out. Pt reports he is all set to start outpatient treatment on Monday 11/21 with [facility]. Pt thanked writer for the referral and happy he has treatment set up.     Mera Ford [facility] CenterTERESE [email or web address]  Direct phone: [phone]

## 2022-11-17 NOTE — PLAN OF CARE
[id number]      VS: VSS. afebrile   O2: >95 on RA. Denies SOB or trouble breathing   Output: Voids spontaneously without pain   Last BM: 11/17/22   Activity: Up ad abdoulaye   Up for meals? yes   Skin: intact   Pain: denies   CMS: A&Ox4. Denies N/T. CIWA done q4h   Dressing: N/a   Diet: regular   LDA: L PIV in hand, SL   Equipment: Pt belongings in room. IV pole in room. Call light within reach   Plan: Treatment to start 11/21 at [facility]. Discharged today   Additional Info:       Maintained CIWA checks throughout shift. Scoring 3. No valium given

## 2022-11-17 NOTE — PLAN OF CARE
[id number]    A&O x4, denies N/V, pain, dizziness, lightheadedness, anxiety, visual and auditory disturbances, and headache.  CIWA 2, no valium given.    VSS, hypertensive (130/96), others WDL.    Pt resting in bed all shift, sleeping or watching TV. No complications or concerns overnight.  Continue POC.

## 2022-11-20 ENCOUNTER — PATIENT OUTREACH (OUTPATIENT)
Dept: CARE COORDINATION | Facility: CLINIC | Age: 27
End: 2022-11-20

## 2022-11-20 NOTE — PROGRESS NOTES
[facility] Contact  [facility]/Voicemail     Clinical Data: Transitional Care Management Outreach     Outreach attempted x 2.  Left message on patient's voicemail, providing [facility]'s 24/7 scheduling and nurse triage phone number [phone] ([phone]) for questions/concerns and/or to schedule an appt with an [facility] provider, if they do not have a PCP.      Plan:  [facility] will do no further outreaches at this time.       Chelsi Renae  Community Health Worker  [facility]  [phone]      *Connected Care Resource Team does NOT follow patient ongoing. Referrals are identified based on internal discharge reports and the outreach is to ensure patient has an understanding of their discharge instructions.

## 2022-12-26 ENCOUNTER — HEALTH MAINTENANCE LETTER (OUTPATIENT)
Age: 27
End: 2022-12-26

## 2023-09-17 ENCOUNTER — HOSPITAL ENCOUNTER (EMERGENCY)
Facility: CLINIC | Age: 28
Discharge: HOME | End: 2023-09-17
Attending: EMERGENCY MEDICINE | Admitting: EMERGENCY MEDICINE

## 2023-09-17 VITALS
OXYGEN SATURATION: 95 % | DIASTOLIC BLOOD PRESSURE: 108 MMHG | HEIGHT: 70 IN | RESPIRATION RATE: 19 BRPM | WEIGHT: 160 LBS | BODY MASS INDEX: 22.9 KG/M2 | HEART RATE: 87 BPM | TEMPERATURE: 97.7 F | SYSTOLIC BLOOD PRESSURE: 153 MMHG

## 2023-09-17 DIAGNOSIS — F10.230 ALCOHOL DEPENDENCE WITH UNCOMPLICATED WITHDRAWAL (H): ICD-10-CM

## 2023-09-17 DIAGNOSIS — F10.90 ALCOHOL USE DISORDER: ICD-10-CM

## 2023-09-17 DIAGNOSIS — E86.0 DEHYDRATION: ICD-10-CM

## 2023-09-17 DIAGNOSIS — N28.9 ACUTE RENAL INSUFFICIENCY: ICD-10-CM

## 2023-09-17 LAB
ALBUMIN SERPL BCG-MCNC: 5.8 G/DL (ref 3.5–5.2)
ALP SERPL-CCNC: 92 U/L (ref 40–129)
ALT SERPL W P-5'-P-CCNC: 37 U/L (ref 0–70)
ANION GAP SERPL CALCULATED.3IONS-SCNC: 17 MMOL/L (ref 7–15)
ANION GAP SERPL CALCULATED.3IONS-SCNC: 34 MMOL/L (ref 7–15)
AST SERPL W P-5'-P-CCNC: 42 U/L (ref 0–45)
BASOPHILS # BLD AUTO: 0 10E3/UL (ref 0–0.2)
BASOPHILS # BLD AUTO: 0.1 10E3/UL (ref 0–0.2)
BASOPHILS NFR BLD AUTO: 0 %
BASOPHILS NFR BLD AUTO: 0 %
BILIRUB SERPL-MCNC: 1.7 MG/DL
BUN SERPL-MCNC: 27.4 MG/DL (ref 6–20)
BUN SERPL-MCNC: 30.2 MG/DL (ref 6–20)
CALCIUM SERPL-MCNC: 11 MG/DL (ref 8.6–10)
CALCIUM SERPL-MCNC: 9.4 MG/DL (ref 8.6–10)
CHLORIDE SERPL-SCNC: 81 MMOL/L (ref 98–107)
CHLORIDE SERPL-SCNC: 88 MMOL/L (ref 98–107)
CREAT SERPL-MCNC: 1.57 MG/DL (ref 0.67–1.17)
CREAT SERPL-MCNC: 1.81 MG/DL (ref 0.67–1.17)
DEPRECATED HCO3 PLAS-SCNC: 26 MMOL/L (ref 22–29)
DEPRECATED HCO3 PLAS-SCNC: 33 MMOL/L (ref 22–29)
EGFRCR SERPLBLD CKD-EPI 2021: 51 ML/MIN/1.73M2
EGFRCR SERPLBLD CKD-EPI 2021: 61 ML/MIN/1.73M2
EOSINOPHIL # BLD AUTO: 0 10E3/UL (ref 0–0.7)
EOSINOPHIL # BLD AUTO: 0 10E3/UL (ref 0–0.7)
EOSINOPHIL NFR BLD AUTO: 0 %
EOSINOPHIL NFR BLD AUTO: 0 %
ERYTHROCYTE [DISTWIDTH] IN BLOOD BY AUTOMATED COUNT: 11.8 % (ref 10–15)
ERYTHROCYTE [DISTWIDTH] IN BLOOD BY AUTOMATED COUNT: 11.8 % (ref 10–15)
ETHANOL SERPL-MCNC: <0.01 G/DL
GLUCOSE SERPL-MCNC: 120 MG/DL (ref 70–99)
GLUCOSE SERPL-MCNC: 231 MG/DL (ref 70–99)
HCT VFR BLD AUTO: 41.7 % (ref 40–53)
HCT VFR BLD AUTO: 47.7 % (ref 40–53)
HGB BLD-MCNC: 15.3 G/DL (ref 13.3–17.7)
HGB BLD-MCNC: 18 G/DL (ref 13.3–17.7)
IMM GRANULOCYTES # BLD: 0.2 10E3/UL
IMM GRANULOCYTES # BLD: 0.4 10E3/UL
IMM GRANULOCYTES NFR BLD: 1 %
IMM GRANULOCYTES NFR BLD: 1 %
LIPASE SERPL-CCNC: 109 U/L (ref 13–60)
LYMPHOCYTES # BLD AUTO: 0.6 10E3/UL (ref 0.8–5.3)
LYMPHOCYTES # BLD AUTO: 0.7 10E3/UL (ref 0.8–5.3)
LYMPHOCYTES NFR BLD AUTO: 2 %
LYMPHOCYTES NFR BLD AUTO: 3 %
MAGNESIUM SERPL-MCNC: 1.6 MG/DL (ref 1.7–2.3)
MCH RBC QN AUTO: 32.8 PG (ref 26.5–33)
MCH RBC QN AUTO: 33.1 PG (ref 26.5–33)
MCHC RBC AUTO-ENTMCNC: 36.7 G/DL (ref 31.5–36.5)
MCHC RBC AUTO-ENTMCNC: 37.7 G/DL (ref 31.5–36.5)
MCV RBC AUTO: 88 FL (ref 78–100)
MCV RBC AUTO: 89 FL (ref 78–100)
MONOCYTES # BLD AUTO: 1.3 10E3/UL (ref 0–1.3)
MONOCYTES # BLD AUTO: 1.4 10E3/UL (ref 0–1.3)
MONOCYTES NFR BLD AUTO: 3 %
MONOCYTES NFR BLD AUTO: 6 %
NEUTROPHILS # BLD AUTO: 21.9 10E3/UL (ref 1.6–8.3)
NEUTROPHILS # BLD AUTO: 34.2 10E3/UL (ref 1.6–8.3)
NEUTROPHILS NFR BLD AUTO: 90 %
NEUTROPHILS NFR BLD AUTO: 94 %
NRBC # BLD AUTO: 0 10E3/UL
NRBC # BLD AUTO: 0 10E3/UL
NRBC BLD AUTO-RTO: 0 /100
NRBC BLD AUTO-RTO: 0 /100
PLATELET # BLD AUTO: 389 10E3/UL (ref 150–450)
PLATELET # BLD AUTO: 490 10E3/UL (ref 150–450)
POTASSIUM SERPL-SCNC: 3.1 MMOL/L (ref 3.4–5.3)
POTASSIUM SERPL-SCNC: 3.6 MMOL/L (ref 3.4–5.3)
PROT SERPL-MCNC: 9.2 G/DL (ref 6.4–8.3)
RBC # BLD AUTO: 4.67 10E6/UL (ref 4.4–5.9)
RBC # BLD AUTO: 5.44 10E6/UL (ref 4.4–5.9)
SODIUM SERPL-SCNC: 138 MMOL/L (ref 136–145)
SODIUM SERPL-SCNC: 141 MMOL/L (ref 136–145)
WBC # BLD AUTO: 24.1 10E3/UL (ref 4–11)
WBC # BLD AUTO: 36.6 10E3/UL (ref 4–11)

## 2023-09-17 PROCEDURE — 250N000011 HC RX IP 250 OP 636: Mod: JZ | Performed by: EMERGENCY MEDICINE

## 2023-09-17 PROCEDURE — C9113 INJ PANTOPRAZOLE SODIUM, VIA: HCPCS | Mod: JZ | Performed by: EMERGENCY MEDICINE

## 2023-09-17 PROCEDURE — 36415 COLL VENOUS BLD VENIPUNCTURE: CPT | Performed by: EMERGENCY MEDICINE

## 2023-09-17 PROCEDURE — 83690 ASSAY OF LIPASE: CPT | Performed by: EMERGENCY MEDICINE

## 2023-09-17 PROCEDURE — 85025 COMPLETE CBC W/AUTO DIFF WBC: CPT | Performed by: EMERGENCY MEDICINE

## 2023-09-17 PROCEDURE — 99284 EMERGENCY DEPT VISIT MOD MDM: CPT | Mod: 25 | Performed by: EMERGENCY MEDICINE

## 2023-09-17 PROCEDURE — 96375 TX/PRO/DX INJ NEW DRUG ADDON: CPT | Performed by: EMERGENCY MEDICINE

## 2023-09-17 PROCEDURE — 258N000003 HC RX IP 258 OP 636: Performed by: EMERGENCY MEDICINE

## 2023-09-17 PROCEDURE — 80053 COMPREHEN METABOLIC PANEL: CPT | Performed by: EMERGENCY MEDICINE

## 2023-09-17 PROCEDURE — 96365 THER/PROPH/DIAG IV INF INIT: CPT | Mod: 59 | Performed by: EMERGENCY MEDICINE

## 2023-09-17 PROCEDURE — 83735 ASSAY OF MAGNESIUM: CPT | Performed by: EMERGENCY MEDICINE

## 2023-09-17 PROCEDURE — 99284 EMERGENCY DEPT VISIT MOD MDM: CPT | Performed by: EMERGENCY MEDICINE

## 2023-09-17 PROCEDURE — 250N000011 HC RX IP 250 OP 636: Performed by: EMERGENCY MEDICINE

## 2023-09-17 PROCEDURE — 96361 HYDRATE IV INFUSION ADD-ON: CPT | Performed by: EMERGENCY MEDICINE

## 2023-09-17 PROCEDURE — 82077 ASSAY SPEC XCP UR&BREATH IA: CPT | Performed by: EMERGENCY MEDICINE

## 2023-09-17 PROCEDURE — 85025 COMPLETE CBC W/AUTO DIFF WBC: CPT | Mod: 91 | Performed by: EMERGENCY MEDICINE

## 2023-09-17 PROCEDURE — 96376 TX/PRO/DX INJ SAME DRUG ADON: CPT | Performed by: EMERGENCY MEDICINE

## 2023-09-17 PROCEDURE — 96374 THER/PROPH/DIAG INJ IV PUSH: CPT | Mod: 59 | Performed by: EMERGENCY MEDICINE

## 2023-09-17 PROCEDURE — 250N000013 HC RX MED GY IP 250 OP 250 PS 637: Performed by: EMERGENCY MEDICINE

## 2023-09-17 RX ORDER — DIAZEPAM 10 MG/2ML
5 INJECTION, SOLUTION INTRAMUSCULAR; INTRAVENOUS ONCE
Status: COMPLETED | OUTPATIENT
Start: 2023-09-17 | End: 2023-09-17

## 2023-09-17 RX ORDER — POTASSIUM CHLORIDE 7.45 MG/ML
10 INJECTION INTRAVENOUS ONCE
Status: COMPLETED | OUTPATIENT
Start: 2023-09-17 | End: 2023-09-17

## 2023-09-17 RX ORDER — ONDANSETRON 2 MG/ML
4 INJECTION INTRAMUSCULAR; INTRAVENOUS ONCE
Status: COMPLETED | OUTPATIENT
Start: 2023-09-17 | End: 2023-09-17

## 2023-09-17 RX ORDER — DIAZEPAM 10 MG/2ML
5-10 INJECTION, SOLUTION INTRAMUSCULAR; INTRAVENOUS
Status: COMPLETED | OUTPATIENT
Start: 2023-09-17 | End: 2023-09-17

## 2023-09-17 RX ORDER — FOLIC ACID 5 MG/ML
1 INJECTION, SOLUTION INTRAMUSCULAR; INTRAVENOUS; SUBCUTANEOUS ONCE
Status: COMPLETED | OUTPATIENT
Start: 2023-09-17 | End: 2023-09-17

## 2023-09-17 RX ORDER — NALTREXONE HYDROCHLORIDE 50 MG/1
50 TABLET, FILM COATED ORAL DAILY
Qty: 30 TABLET | Refills: 0 | Status: SHIPPED | OUTPATIENT
Start: 2023-09-17 | End: 2023-10-17

## 2023-09-17 RX ORDER — ONDANSETRON 4 MG/1
4 TABLET, ORALLY DISINTEGRATING ORAL EVERY 6 HOURS PRN
Qty: 10 TABLET | Refills: 0 | Status: SHIPPED | OUTPATIENT
Start: 2023-09-17 | End: 2023-09-20

## 2023-09-17 RX ORDER — GABAPENTIN 300 MG/1
300 CAPSULE ORAL 3 TIMES DAILY
Qty: 10 CAPSULE | Refills: 0 | Status: SHIPPED | OUTPATIENT
Start: 2023-09-17

## 2023-09-17 RX ORDER — POTASSIUM CHLORIDE 1500 MG/1
40 TABLET, EXTENDED RELEASE ORAL ONCE
Status: COMPLETED | OUTPATIENT
Start: 2023-09-17 | End: 2023-09-17

## 2023-09-17 RX ORDER — THIAMINE HYDROCHLORIDE 100 MG/ML
100 INJECTION, SOLUTION INTRAMUSCULAR; INTRAVENOUS ONCE
Status: COMPLETED | OUTPATIENT
Start: 2023-09-17 | End: 2023-09-17

## 2023-09-17 RX ORDER — NALTREXONE HYDROCHLORIDE 50 MG/1
50 TABLET, FILM COATED ORAL ONCE
Status: COMPLETED | OUTPATIENT
Start: 2023-09-17 | End: 2023-09-17

## 2023-09-17 RX ORDER — GABAPENTIN 300 MG/1
300 CAPSULE ORAL ONCE
Status: COMPLETED | OUTPATIENT
Start: 2023-09-17 | End: 2023-09-17

## 2023-09-17 RX ORDER — DIAZEPAM 10 MG/2ML
10 INJECTION, SOLUTION INTRAMUSCULAR; INTRAVENOUS ONCE
Status: COMPLETED | OUTPATIENT
Start: 2023-09-17 | End: 2023-09-17

## 2023-09-17 RX ADMIN — SODIUM CHLORIDE, POTASSIUM CHLORIDE, SODIUM LACTATE AND CALCIUM CHLORIDE 1000 ML: 600; 310; 30; 20 INJECTION, SOLUTION INTRAVENOUS at 11:31

## 2023-09-17 RX ADMIN — SODIUM CHLORIDE, POTASSIUM CHLORIDE, SODIUM LACTATE AND CALCIUM CHLORIDE 1000 ML: 600; 310; 30; 20 INJECTION, SOLUTION INTRAVENOUS at 02:37

## 2023-09-17 RX ADMIN — DIAZEPAM 10 MG: 10 INJECTION, SOLUTION INTRAMUSCULAR; INTRAVENOUS at 07:58

## 2023-09-17 RX ADMIN — FOLIC ACID 1 MG: 5 INJECTION, SOLUTION INTRAMUSCULAR; INTRAVENOUS; SUBCUTANEOUS at 02:53

## 2023-09-17 RX ADMIN — DIAZEPAM 5 MG: 10 INJECTION, SOLUTION INTRAMUSCULAR; INTRAVENOUS at 02:35

## 2023-09-17 RX ADMIN — GABAPENTIN 300 MG: 300 CAPSULE ORAL at 06:15

## 2023-09-17 RX ADMIN — SODIUM CHLORIDE, POTASSIUM CHLORIDE, SODIUM LACTATE AND CALCIUM CHLORIDE 1000 ML: 600; 310; 30; 20 INJECTION, SOLUTION INTRAVENOUS at 05:25

## 2023-09-17 RX ADMIN — ONDANSETRON 4 MG: 2 INJECTION INTRAMUSCULAR; INTRAVENOUS at 05:44

## 2023-09-17 RX ADMIN — POTASSIUM CHLORIDE 10 MEQ: 7.46 INJECTION, SOLUTION INTRAVENOUS at 03:32

## 2023-09-17 RX ADMIN — POTASSIUM CHLORIDE 40 MEQ: 1500 TABLET, EXTENDED RELEASE ORAL at 03:32

## 2023-09-17 RX ADMIN — THIAMINE HYDROCHLORIDE 100 MG: 100 INJECTION, SOLUTION INTRAMUSCULAR; INTRAVENOUS at 02:37

## 2023-09-17 RX ADMIN — PANTOPRAZOLE SODIUM 40 MG: 40 INJECTION, POWDER, FOR SOLUTION INTRAVENOUS at 05:44

## 2023-09-17 RX ADMIN — NALTREXONE HYDROCHLORIDE 50 MG: 50 TABLET, FILM COATED ORAL at 06:15

## 2023-09-17 RX ADMIN — DIAZEPAM 10 MG: 10 INJECTION, SOLUTION INTRAMUSCULAR; INTRAVENOUS at 05:30

## 2023-09-17 RX ADMIN — ONDANSETRON 4 MG: 2 INJECTION INTRAMUSCULAR; INTRAVENOUS at 11:35

## 2023-09-17 RX ADMIN — ONDANSETRON 4 MG: 2 INJECTION INTRAMUSCULAR; INTRAVENOUS at 02:31

## 2023-09-17 RX ADMIN — DIAZEPAM 10 MG: 10 INJECTION, SOLUTION INTRAMUSCULAR; INTRAVENOUS at 10:18

## 2023-09-17 RX ADMIN — DIAZEPAM 10 MG: 10 INJECTION, SOLUTION INTRAMUSCULAR; INTRAVENOUS at 06:05

## 2023-09-17 RX ADMIN — DIAZEPAM 10 MG: 10 INJECTION, SOLUTION INTRAMUSCULAR; INTRAVENOUS at 03:27

## 2023-09-17 ASSESSMENT — ENCOUNTER SYMPTOMS
MYALGIAS: 1
ABDOMINAL PAIN: 1
SHORTNESS OF BREATH: 1
LIGHT-HEADEDNESS: 0
NUMBNESS: 0
SPEECH DIFFICULTY: 0
BACK PAIN: 0
COUGH: 0
APPETITE CHANGE: 1
DIARRHEA: 0
FATIGUE: 1
CHEST TIGHTNESS: 1
VOMITING: 1
TROUBLE SWALLOWING: 0
SEIZURES: 0
NAUSEA: 1
DYSPHORIC MOOD: 1
CHILLS: 1

## 2023-09-17 ASSESSMENT — ACTIVITIES OF DAILY LIVING (ADL)
ADLS_ACUITY_SCORE: 35

## 2023-09-17 NOTE — ED PROVIDER NOTES
Emergency Department Patient Sign-out       Brief HPI:  This is a 27 year old male signed out to me by Dr. Drake .  See initial ED Provider note for details of the presentation.     Dr. Drake HPI  Brenden Bishop is a 27 year old male with history of alcohol use disorder and panic attacks presenting for evaluation of severe anxiety and withdrawal symptoms.  Patient reports he had been sober for several months and then was binge drinking over the past few days.  Last drink was about 24 hours ago.  He reports drinking 1.75 mils of vodka in the preceding 2 to 3 days.  Patient reports has had difficulty with withdrawals in the past and is concerned he is going through severe withdrawal.  He also admits to having a panic attack.  Patient reports feeling tightness in his throat and some difficulty breathing.  Denies cough, fever, or chills.  Patient reports muscle aches and spasms along with abdominal cramping, nausea, and vomiting.  No diarrhea.  Symptoms similar to previous withdrawals but more severe he reports.  Denies history of seizures.  Does report he has been in the hospital in the past with IV medications required for his withdrawals.     Patient has required several doses of diazepam, subjectively feeling better, repeat labs show leukocytosis lessening, creatinine trending toward normal, magnesium low and on replacement.  Repeat potassium normalized to 3.6. Another liter of crystalloid ordered and will reevaluate.    Patient reevaluated at 1330, feels well enough for discharge to home, ondansetron, gabapentin, naltrexone are prescribed.  Advance diet as tolerated.  Avoid alcohol, avoid ibuprofen.  Follow-up primary care, return criteria reviewed.      Exam:   Patient Vitals for the past 24 hrs:   BP Temp Temp src Pulse Resp SpO2 Height Weight   09/17/23 1230 (!) 144/102 -- -- 89 -- 96 % -- --   09/17/23 1158 (!) 151/106 -- -- -- -- 97 % -- --   09/17/23 1137 (!) 148/97 -- -- 106 -- 99 % -- --   09/17/23  "1100 128/84 -- -- 95 -- 96 % -- --   09/17/23 1030 118/81 -- -- 91 -- -- -- --   09/17/23 1005 -- -- -- -- -- 96 % 1.778 m (5' 10\") 72.6 kg (160 lb)   09/17/23 1000 (!) 138/96 -- -- 91 -- -- -- --   09/17/23 0930 137/84 -- -- 90 -- 97 % -- --   09/17/23 0807 (!) 145/109 -- -- 95 19 97 % -- --   09/17/23 0737 (!) 149/109 -- -- 112 17 97 % -- --   09/17/23 0707 (!) 153/115 -- -- 102 19 97 % -- --   09/17/23 0645 -- -- -- 104 16 96 % -- --   09/17/23 0630 (!) 143/106 -- -- 98 21 98 % -- --   09/17/23 0615 -- -- -- 101 25 98 % -- --   09/17/23 0600 (!) 136/104 -- -- 98 24 96 % -- --   09/17/23 0545 -- -- -- 98 21 96 % -- --   09/17/23 0530 (!) 135/108 -- -- 108 14 99 % -- --   09/17/23 0515 -- -- -- 101 23 97 % -- --   09/17/23 0500 (!) 151/103 -- -- 102 20 97 % -- --   09/17/23 0445 -- -- -- 101 18 97 % -- --   09/17/23 0430 (!) 141/108 -- -- 111 19 97 % -- --   09/17/23 0415 -- -- -- 103 16 96 % -- --   09/17/23 0400 (!) 143/108 -- -- 105 19 97 % -- --   09/17/23 0345 -- -- -- (!) 121 28 97 % -- --   09/17/23 0330 (!) 140/107 -- -- 115 12 97 % -- --   09/17/23 0305 (!) 132/97 -- -- 111 (!) 8 98 % -- --   09/17/23 0300 -- -- -- 111 (!) 9 97 % -- --   09/17/23 0253 (!) 131/98 -- -- 120 15 98 % -- --   09/17/23 0245 -- -- -- 120 18 98 % -- --   09/17/23 0235 -- -- -- (!) 134 16 98 % -- --   09/17/23 0230 -- -- -- (!) 130 -- 97 % -- --   09/17/23 0218 (!) 138/103 97.7  F (36.5  C) Oral (!) 125 20 99 % -- --           ED RESULTS:   Results for orders placed or performed during the hospital encounter of 09/17/23 (from the past 24 hour(s))   Comprehensive metabolic panel     Status: Abnormal    Collection Time: 09/17/23  2:29 AM   Result Value Ref Range    Sodium 141 136 - 145 mmol/L    Potassium 3.1 (L) 3.4 - 5.3 mmol/L    Chloride 81 (L) 98 - 107 mmol/L    Carbon Dioxide (CO2) 26 22 - 29 mmol/L    Anion Gap 34 (H) 7 - 15 mmol/L    Urea Nitrogen 27.4 (H) 6.0 - 20.0 mg/dL    Creatinine 1.81 (H) 0.67 - 1.17 mg/dL    Calcium " 11.0 (H) 8.6 - 10.0 mg/dL    Glucose 231 (H) 70 - 99 mg/dL    Alkaline Phosphatase 92 40 - 129 U/L    AST 42 0 - 45 U/L    ALT 37 0 - 70 U/L    Protein Total 9.2 (H) 6.4 - 8.3 g/dL    Albumin 5.8 (H) 3.5 - 5.2 g/dL    Bilirubin Total 1.7 (H) <=1.2 mg/dL    GFR Estimate 51 (L) >60 mL/min/1.73m2   CBC with platelets, differential     Status: Abnormal    Collection Time: 09/17/23  2:29 AM    Narrative    The following orders were created for panel order CBC with platelets, differential.  Procedure                               Abnormality         Status                     ---------                               -----------         ------                     CBC with platelets and d...[[id number]]  Abnormal            Final result                 Please view results for these tests on the individual orders.   Alcohol level blood     Status: Normal    Collection Time: 09/17/23  2:29 AM   Result Value Ref Range    Alcohol ethyl <0.01 <=0.01 g/dL   CBC with platelets and differential     Status: Abnormal    Collection Time: 09/17/23  2:29 AM   Result Value Ref Range    WBC Count 36.6 (H) 4.0 - 11.0 10e3/uL    RBC Count 5.44 4.40 - 5.90 10e6/uL    Hemoglobin 18.0 (H) 13.3 - 17.7 g/dL    Hematocrit 47.7 40.0 - 53.0 %    MCV 88 78 - 100 fL    MCH 33.1 (H) 26.5 - 33.0 pg    MCHC 37.7 (H) 31.5 - 36.5 g/dL    RDW 11.8 10.0 - 15.0 %    Platelet Count 490 (H) 150 - 450 10e3/uL    % Neutrophils 94 %    % Lymphocytes 2 %    % Monocytes 3 %    % Eosinophils 0 %    % Basophils 0 %    % Immature Granulocytes 1 %    NRBCs per 100 WBC 0 <1 /100    Absolute Neutrophils 34.2 (H) 1.6 - 8.3 10e3/uL    Absolute Lymphocytes 0.6 (L) 0.8 - 5.3 10e3/uL    Absolute Monocytes 1.3 0.0 - 1.3 10e3/uL    Absolute Eosinophils 0.0 0.0 - 0.7 10e3/uL    Absolute Basophils 0.1 0.0 - 0.2 10e3/uL    Absolute Immature Granulocytes 0.4 <=0.4 10e3/uL    Absolute NRBCs 0.0 10e3/uL   Lipase     Status: Abnormal    Collection Time: 09/17/23  2:29 AM   Result Value  Ref Range    Lipase 109 (H) 13 - 60 U/L   Basic metabolic panel     Status: Abnormal    Collection Time: 09/17/23  7:54 AM   Result Value Ref Range    Sodium 138 136 - 145 mmol/L    Potassium 3.6 3.4 - 5.3 mmol/L    Chloride 88 (L) 98 - 107 mmol/L    Carbon Dioxide (CO2) 33 (H) 22 - 29 mmol/L    Anion Gap 17 (H) 7 - 15 mmol/L    Urea Nitrogen 30.2 (H) 6.0 - 20.0 mg/dL    Creatinine 1.57 (H) 0.67 - 1.17 mg/dL    Calcium 9.4 8.6 - 10.0 mg/dL    Glucose 120 (H) 70 - 99 mg/dL    GFR Estimate 61 >60 mL/min/1.73m2   CBC with platelets, differential     Status: Abnormal    Collection Time: 09/17/23  7:54 AM    Narrative    The following orders were created for panel order CBC with platelets, differential.  Procedure                               Abnormality         Status                     ---------                               -----------         ------                     CBC with platelets and d...[[id number]]  Abnormal            Final result                 Please view results for these tests on the individual orders.   Magnesium     Status: Abnormal    Collection Time: 09/17/23  7:54 AM   Result Value Ref Range    Magnesium 1.6 (L) 1.7 - 2.3 mg/dL   CBC with platelets and differential     Status: Abnormal    Collection Time: 09/17/23  7:54 AM   Result Value Ref Range    WBC Count 24.1 (H) 4.0 - 11.0 10e3/uL    RBC Count 4.67 4.40 - 5.90 10e6/uL    Hemoglobin 15.3 13.3 - 17.7 g/dL    Hematocrit 41.7 40.0 - 53.0 %    MCV 89 78 - 100 fL    MCH 32.8 26.5 - 33.0 pg    MCHC 36.7 (H) 31.5 - 36.5 g/dL    RDW 11.8 10.0 - 15.0 %    Platelet Count 389 150 - 450 10e3/uL    % Neutrophils 90 %    % Lymphocytes 3 %    % Monocytes 6 %    % Eosinophils 0 %    % Basophils 0 %    % Immature Granulocytes 1 %    NRBCs per 100 WBC 0 <1 /100    Absolute Neutrophils 21.9 (H) 1.6 - 8.3 10e3/uL    Absolute Lymphocytes 0.7 (L) 0.8 - 5.3 10e3/uL    Absolute Monocytes 1.4 (H) 0.0 - 1.3 10e3/uL    Absolute Eosinophils 0.0 0.0 - 0.7 10e3/uL     Absolute Basophils 0.0 0.0 - 0.2 10e3/uL    Absolute Immature Granulocytes 0.2 <=0.4 10e3/uL    Absolute NRBCs 0.0 10e3/uL       ED MEDICATIONS:   Medications   ondansetron (ZOFRAN) injection 4 mg (4 mg Intravenous $Given 9/17/23 0231)   thiamine (B-1) injection 100 mg (100 mg Intravenous $Given 9/17/23 0237)   folic acid injection 1 mg (1 mg Intravenous $Given 9/17/23 0253)   lactated ringers BOLUS 1,000 mL (0 mLs Intravenous Stopped 9/17/23 0523)   diazepam (VALIUM) injection 5 mg (5 mg Intravenous $Given 9/17/23 0235)   diazepam (VALIUM) injection 5-10 mg (10 mg Intravenous $Given 9/17/23 0758)   lactated ringers BOLUS 1,000 mL (0 mLs Intravenous Stopped 9/17/23 0803)   potassium chloride ER (KLOR-CON M) CR tablet 40 mEq (40 mEq Oral $Given 9/17/23 0332)   potassium chloride 10 mEq in 100 mL sterile water infusion (0 mEq Intravenous Stopped 9/17/23 0438)   pantoprazole (PROTONIX) IV push injection 40 mg (40 mg Intravenous $Given 9/17/23 0544)   ondansetron (ZOFRAN) injection 4 mg (4 mg Intravenous $Given 9/17/23 0544)   naltrexone (DEPADE/REVIA) tablet 50 mg (50 mg Oral $Given 9/17/23 0615)   gabapentin (NEURONTIN) capsule 300 mg (300 mg Oral $Given 9/17/23 0615)   diazepam (VALIUM) injection 10 mg (10 mg Intravenous $Given 9/17/23 1018)   lactated ringers BOLUS 1,000 mL (1,000 mLs Intravenous $New Bag 9/17/23 1131)   ondansetron (ZOFRAN) injection 4 mg (4 mg Intravenous $Given 9/17/23 1135)         Impression:    ICD-10-CM    1. Alcohol dependence with uncomplicated withdrawal (H)  F10.230       2. Dehydration  E86.0       3. Acute renal insufficiency  N28.9       4. Alcohol use disorder  F10.90           Plan:         MD Torin Calles Scott L, MD  09/17/23 0571

## 2023-09-17 NOTE — ED PROVIDER NOTES
History     Chief Complaint   Patient presents with    Withdrawal     HPI  Brenden Bishop is a 27 year old male with history of alcohol use disorder and panic attacks presenting for evaluation of severe anxiety and withdrawal symptoms.  Patient reports he had been sober for several months and then was binge drinking over the past few days.  Last drink was about 24 hours ago.  He reports drinking 1.75 mils of vodka in the preceding 2 to 3 days.  Patient reports has had difficulty with withdrawals in the past and is concerned he is going through severe withdrawal.  He also admits to having a panic attack.  Patient reports feeling tightness in his throat and some difficulty breathing.  Denies cough, fever, or chills.  Patient reports muscle aches and spasms along with abdominal cramping, nausea, and vomiting.  No diarrhea.  Symptoms similar to previous withdrawals but more severe he reports.  Denies history of seizures.  Does report he has been in the hospital in the past with IV medications required for his withdrawals.    Allergies:  No Known Allergies    Problem List:    Patient Active Problem List    Diagnosis Date Noted    Alcohol withdrawal syndrome without complication (H) 11/14/2022     Priority: Medium    Alcohol-induced acute pancreatitis 06/21/2022     Priority: Medium    Amphetamine use disorder, severe, dependence (H) 03/25/2022     Priority: Medium     Formatting of this note might be different from the original.  AMS, found c crushed adderall and DEBORAH >0.6.      PTSD (post-traumatic stress disorder) 01/20/2017     Priority: Medium    Major depressive disorder, recurrent, in partial remission (H) 12/23/2016     Priority: Medium    Panic attacks 12/23/2016     Priority: Medium    Social anxiety disorder 12/23/2016     Priority: Medium    Eating disorder 07/11/2014     Priority: Medium    Bipolar 1 disorder (H) 06/09/2014     Priority: Medium    Suicide attempt (H) 06/07/2014     Priority: Medium    Head  ache 09/26/2011     Priority: Medium    Anxiety state 09/25/2011     Priority: Medium     Problem list name updated by automated process. Provider to review      Cannabis use disorder, mild, abuse 09/25/2011     Priority: Medium     Problem list name updated by automated process. Provider to review      Alcohol abuse 09/25/2011     Priority: Medium     Problem list name updated by automated process. Provider to review      Asthma, cough variant 06/07/2010     Priority: Medium    Attention deficit hyperactivity disorder (ADHD) 03/03/2008     Priority: Medium     Problem list name updated by automated process. Provider to review    Formatting of this note might be different from the original.  Rule out ADHD (HR)          Past Medical History:    No past medical history on file.    Past Surgical History:    Past Surgical History:   Procedure Laterality Date    ORTHOPEDIC SURGERY      wrist surg        Family History:    No family history on file.    Social History:  Marital Status:  Single [1]  Social History     Tobacco Use    Smoking status: Former    Smokeless tobacco: Current   Substance Use Topics    Alcohol use: Yes     Comment: 1 liter daily    Drug use: Yes     Types: Marijuana        Medications:    gabapentin (NEURONTIN) 300 MG capsule  naltrexone (DEPADE/REVIA) 50 MG tablet  baclofen (LIORESAL) 20 MG tablet  QUEtiapine (SEROQUEL) 25 MG tablet          Review of Systems   Constitutional:  Positive for appetite change, chills and fatigue.   HENT:  Negative for congestion and trouble swallowing.    Respiratory:  Positive for chest tightness and shortness of breath. Negative for cough.    Cardiovascular:  Negative for chest pain.   Gastrointestinal:  Positive for abdominal pain, nausea and vomiting. Negative for diarrhea.   Genitourinary:  Positive for decreased urine volume.   Musculoskeletal:  Positive for myalgias. Negative for back pain.   Skin:  Negative for rash.   Neurological:  Negative for seizures,  syncope, speech difficulty, light-headedness and numbness.   Psychiatric/Behavioral:  Positive for dysphoric mood.    All other systems reviewed and are negative.      Physical Exam   BP: (!) 138/103  Pulse: (!) 125  Temp: 97.7  F (36.5  C)  Resp: 20  SpO2: 99 %      Physical Exam  Vitals and nursing note reviewed.   Constitutional:       Appearance: He is not diaphoretic.      Comments: Awake and alert, anxious, breathing quickly.  Able to answer questions and participate in exam.   HENT:      Head: Normocephalic.      Nose: Nose normal.      Mouth/Throat:      Mouth: Mucous membranes are dry.   Eyes:      Pupils: Pupils are equal, round, and reactive to light.      Comments: Pupils dilated   Cardiovascular:      Rate and Rhythm: Regular rhythm. Tachycardia present.      Pulses: Normal pulses.   Pulmonary:      Breath sounds: Normal breath sounds.      Comments: Tachypneic with clear lungs  Abdominal:      Palpations: Abdomen is soft.      Tenderness: There is abdominal tenderness (Diffuse abdominal tenderness). There is no guarding.   Musculoskeletal:         General: Normal range of motion.      Cervical back: Normal range of motion.   Skin:     General: Skin is warm and dry.      Capillary Refill: Capillary refill takes less than 2 seconds.   Neurological:      Mental Status: He is alert and oriented to person, place, and time.   Psychiatric:         Mood and Affect: Mood is anxious.         ED Course                 Procedures                Results for orders placed or performed during the hospital encounter of 09/17/23 (from the past 24 hour(s))   Comprehensive metabolic panel   Result Value Ref Range    Sodium 141 136 - 145 mmol/L    Potassium 3.1 (L) 3.4 - 5.3 mmol/L    Chloride 81 (L) 98 - 107 mmol/L    Carbon Dioxide (CO2) 26 22 - 29 mmol/L    Anion Gap 34 (H) 7 - 15 mmol/L    Urea Nitrogen 27.4 (H) 6.0 - 20.0 mg/dL    Creatinine 1.81 (H) 0.67 - 1.17 mg/dL    Calcium 11.0 (H) 8.6 - 10.0 mg/dL    Glucose  231 (H) 70 - 99 mg/dL    Alkaline Phosphatase 92 40 - 129 U/L    AST 42 0 - 45 U/L    ALT 37 0 - 70 U/L    Protein Total 9.2 (H) 6.4 - 8.3 g/dL    Albumin 5.8 (H) 3.5 - 5.2 g/dL    Bilirubin Total 1.7 (H) <=1.2 mg/dL    GFR Estimate 51 (L) >60 mL/min/1.73m2   CBC with platelets, differential    Narrative    The following orders were created for panel order CBC with platelets, differential.  Procedure                               Abnormality         Status                     ---------                               -----------         ------                     CBC with platelets and d...[[id number]]  Abnormal            Final result                 Please view results for these tests on the individual orders.   Alcohol level blood   Result Value Ref Range    Alcohol ethyl <0.01 <=0.01 g/dL   CBC with platelets and differential   Result Value Ref Range    WBC Count 36.6 (H) 4.0 - 11.0 10e3/uL    RBC Count 5.44 4.40 - 5.90 10e6/uL    Hemoglobin 18.0 (H) 13.3 - 17.7 g/dL    Hematocrit 47.7 40.0 - 53.0 %    MCV 88 78 - 100 fL    MCH 33.1 (H) 26.5 - 33.0 pg    MCHC 37.7 (H) 31.5 - 36.5 g/dL    RDW 11.8 10.0 - 15.0 %    Platelet Count 490 (H) 150 - 450 10e3/uL    % Neutrophils 94 %    % Lymphocytes 2 %    % Monocytes 3 %    % Eosinophils 0 %    % Basophils 0 %    % Immature Granulocytes 1 %    NRBCs per 100 WBC 0 <1 /100    Absolute Neutrophils 34.2 (H) 1.6 - 8.3 10e3/uL    Absolute Lymphocytes 0.6 (L) 0.8 - 5.3 10e3/uL    Absolute Monocytes 1.3 0.0 - 1.3 10e3/uL    Absolute Eosinophils 0.0 0.0 - 0.7 10e3/uL    Absolute Basophils 0.1 0.0 - 0.2 10e3/uL    Absolute Immature Granulocytes 0.4 <=0.4 10e3/uL    Absolute NRBCs 0.0 10e3/uL   Lipase   Result Value Ref Range    Lipase 109 (H) 13 - 60 U/L       Medications   diazepam (VALIUM) injection 5-10 mg (10 mg Intravenous $Given 9/17/23 1031)   lactated ringers BOLUS 1,000 mL (1,000 mLs Intravenous $New Bag 9/17/23 0625)   ondansetron (ZOFRAN) injection 4 mg (4 mg Intravenous  $Given 9/17/23 0231)   thiamine (B-1) injection 100 mg (100 mg Intravenous $Given 9/17/23 0237)   folic acid injection 1 mg (1 mg Intravenous $Given 9/17/23 0253)   lactated ringers BOLUS 1,000 mL (0 mLs Intravenous Stopped 9/17/23 0523)   diazepam (VALIUM) injection 5 mg (5 mg Intravenous $Given 9/17/23 0235)   potassium chloride ER (KLOR-CON M) CR tablet 40 mEq (40 mEq Oral $Given 9/17/23 0332)   potassium chloride 10 mEq in 100 mL sterile water infusion (0 mEq Intravenous Stopped 9/17/23 0438)   pantoprazole (PROTONIX) IV push injection 40 mg (40 mg Intravenous $Given 9/17/23 0544)   ondansetron (ZOFRAN) injection 4 mg (4 mg Intravenous $Given 9/17/23 0544)   naltrexone (DEPADE/REVIA) tablet 50 mg (50 mg Oral $Given 9/17/23 0615)   gabapentin (NEURONTIN) capsule 300 mg (300 mg Oral $Given 9/17/23 0615)     5:25 AM Patient re-assessed: Remains tachycardic and hypertensive.  Will continue dose of diazepam.    6:00 AM Patient re-assessed: Patient feeling somewhat better but still slightly shaky with intermittent chills.  Remains mildly tachycardic and hypertensive.  Discussed treatment options with him.  Patient is interested in medical assisted therapy.  We will give a dose of naltrexone and gabapentin now.    6:25 AM: Patient was signed out at shift change to Dr Harkins.        Assessments & Plan (with Medical Decision Making)  27-year-old male with history alcohol use disorder presenting for evaluation of alcohol withdrawal.  Last drink about 24 hours before ED arrival.  Shaky, tremulous, and anxious upon arrival with tachycardia and hypertension.  Treated for withdrawal with some GI upset with medications as above.  Had a history of pancreatitis and does have a mildly elevated lipase.  Symptoms greatly improved with medications.  Had evidence of acute renal injury from dehydration.  Given IV fluids to help address this.  Did have notable white count but thought to be reactive due to his acute stress from  withdrawal.  No fever or other infectious symptoms.  Patient interested in medical assisted therapy to stay sober and has been successfully treated with naltrexone and gabapentin in the past so this was restarted.  Was feeling much better at shift change but still requiring further medical treatment so was signed out pending final disposition.     I have reviewed the nursing notes.    I have reviewed the findings, diagnosis, plan and need for follow up with the patient.             New Prescriptions    GABAPENTIN (NEURONTIN) 300 MG CAPSULE    Take 1 capsule (300 mg) by mouth 3 times daily    NALTREXONE (DEPADE/REVIA) 50 MG TABLET    Take 1 tablet (50 mg) by mouth daily for 30 days       Final diagnoses:   Alcohol dependence with uncomplicated withdrawal (H)   Dehydration   Acute renal insufficiency   Alcohol use disorder       9/17/2023   [facility] EMERGENCY DEPT       Drake, Eladio Holcomb MD  09/17/23 1306

## 2023-09-17 NOTE — DISCHARGE INSTRUCTIONS
Do not drink any alcohol    Go to AA get a sponsor do the steps    Drink plenty fluids and rest    Advance diet as tolerated    Avoid ibuprofen    Return here for any concerns

## 2023-09-17 NOTE — ED NOTES
Bed: ED08  Expected date:   Expected time:   Means of arrival:   Comments:  Wall mount for oxygen needs repair, Work order in.

## 2023-09-17 NOTE — ED TRIAGE NOTES
Patient is recovering alcoholic. Went on binge drinking episode for 3 days. States he consumed 1.5 L of vodka during this period. Last drink was 36 hours ago. Pt shaking, anxious, and vomiting for the past 24 hours. Having muscle cramps for 24 hours.

## 2024-02-04 ENCOUNTER — HEALTH MAINTENANCE LETTER (OUTPATIENT)
Age: 29
End: 2024-02-04

## 2024-05-13 NOTE — ED NOTES
Pt on call light.  Upon entering the room the PT noted to have had a lrg liquid emesis.  Pt encourage to stop drinking water but noted still keep drinking water when coming back in room with IV med for nausea.  Pt given IV med and encouraged again to hold off on water at this time.  Pt stating feeling very nauseas when he is withdrawing in the past.   actual

## 2025-03-02 ENCOUNTER — HEALTH MAINTENANCE LETTER (OUTPATIENT)
Age: 30
End: 2025-03-02

## 2025-04-14 ENCOUNTER — HOSPITAL ENCOUNTER (EMERGENCY)
Facility: CLINIC | Age: 30
Discharge: PSYCHIATRIC OR SUBSTANCE USE FACILITY | End: 2025-04-14
Payer: COMMERCIAL

## 2025-04-14 VITALS
HEART RATE: 101 BPM | BODY MASS INDEX: 22.96 KG/M2 | TEMPERATURE: 98.2 F | WEIGHT: 160 LBS | SYSTOLIC BLOOD PRESSURE: 131 MMHG | DIASTOLIC BLOOD PRESSURE: 83 MMHG | RESPIRATION RATE: 16 BRPM | OXYGEN SATURATION: 95 %

## 2025-04-14 PROCEDURE — 99281 EMR DPT VST MAYX REQ PHY/QHP: CPT

## 2025-04-14 ASSESSMENT — ACTIVITIES OF DAILY LIVING (ADL): ADLS_ACUITY_SCORE: 41

## 2025-04-14 NOTE — ED TRIAGE NOTES
Arrives from home with ETOH intoxication, was going to be admitted to [facility] today & blew over .40 so sent here for fluids to bring him down. Not 1st admission, denies other illicit use. Has been trying to wean since Friday, girlfriend reports 30seconds of seizure like activity last night, has been trying to give him just enough to keep withdrawal at bay. Normally drinks about (2) 750ml liquor per day. Denies hx of other seizures in past.      Triage Assessment (Adult)       Row Name 04/14/25 7777          Triage Assessment    Airway WDL WDL        Respiratory WDL    Respiratory WDL WDL        Skin Circulation/Temperature WDL    Skin Circulation/Temperature WDL WDL        Cardiac WDL    Cardiac WDL WDL        Peripheral/Neurovascular WDL    Peripheral Neurovascular WDL WDL        Cognitive/Neuro/Behavioral WDL    Cognitive/Neuro/Behavioral WDL WDL

## 2025-07-19 ENCOUNTER — APPOINTMENT (OUTPATIENT)
Dept: GENERAL RADIOLOGY | Facility: CLINIC | Age: 30
End: 2025-07-19
Attending: EMERGENCY MEDICINE
Payer: COMMERCIAL

## 2025-07-19 ENCOUNTER — APPOINTMENT (OUTPATIENT)
Dept: CT IMAGING | Facility: CLINIC | Age: 30
End: 2025-07-19
Attending: EMERGENCY MEDICINE
Payer: COMMERCIAL

## 2025-07-19 ENCOUNTER — HOSPITAL ENCOUNTER (INPATIENT)
Facility: CLINIC | Age: 30
LOS: 3 days | Discharge: HOME | End: 2025-07-22
Attending: EMERGENCY MEDICINE | Admitting: STUDENT IN AN ORGANIZED HEALTH CARE EDUCATION/TRAINING PROGRAM
Payer: COMMERCIAL

## 2025-07-19 DIAGNOSIS — R74.01 ELEVATED AST (SGOT): ICD-10-CM

## 2025-07-19 DIAGNOSIS — N17.9 ACUTE KIDNEY INJURY: ICD-10-CM

## 2025-07-19 DIAGNOSIS — N17.9 AKI (ACUTE KIDNEY INJURY): ICD-10-CM

## 2025-07-19 DIAGNOSIS — I10 ESSENTIAL HYPERTENSION: ICD-10-CM

## 2025-07-19 DIAGNOSIS — R79.89 ELEVATED TROPONIN: ICD-10-CM

## 2025-07-19 DIAGNOSIS — R17 ELEVATED BILIRUBIN: ICD-10-CM

## 2025-07-19 DIAGNOSIS — F41.1 ANXIETY STATE: ICD-10-CM

## 2025-07-19 DIAGNOSIS — F33.41 MAJOR DEPRESSIVE DISORDER, RECURRENT, IN PARTIAL REMISSION: ICD-10-CM

## 2025-07-19 DIAGNOSIS — R79.89 ELEVATED BRAIN NATRIURETIC PEPTIDE (BNP) LEVEL: ICD-10-CM

## 2025-07-19 DIAGNOSIS — I48.91 ATRIAL FIBRILLATION WITH RVR (H): ICD-10-CM

## 2025-07-19 DIAGNOSIS — F10.90 ALCOHOL USE DISORDER: ICD-10-CM

## 2025-07-19 DIAGNOSIS — R51.9 NONINTRACTABLE HEADACHE, UNSPECIFIED CHRONICITY PATTERN, UNSPECIFIED HEADACHE TYPE: Primary | ICD-10-CM

## 2025-07-19 DIAGNOSIS — E87.8 HYPOCHLOREMIA: ICD-10-CM

## 2025-07-19 DIAGNOSIS — E83.39 HYPERPHOSPHATEMIA: ICD-10-CM

## 2025-07-19 DIAGNOSIS — F41.0 PANIC ATTACKS: ICD-10-CM

## 2025-07-19 DIAGNOSIS — R74.8 ELEVATED LIPASE: ICD-10-CM

## 2025-07-19 DIAGNOSIS — F31.9 BIPOLAR 1 DISORDER (H): ICD-10-CM

## 2025-07-19 DIAGNOSIS — F10.939 ALCOHOL WITHDRAWAL SYNDROME, WITH UNSPECIFIED COMPLICATION (H): ICD-10-CM

## 2025-07-19 DIAGNOSIS — R73.9 HYPERGLYCEMIA: ICD-10-CM

## 2025-07-19 PROBLEM — E87.4 METABOLIC ALKALOSIS WITH RESPIRATORY ACIDOSIS: Status: ACTIVE | Noted: 2025-07-19

## 2025-07-19 LAB
ALBUMIN SERPL BCG-MCNC: 5.1 G/DL (ref 3.5–5.2)
ALBUMIN UR-MCNC: 70 MG/DL
ALP SERPL-CCNC: 113 U/L (ref 40–150)
ALT SERPL W P-5'-P-CCNC: 64 U/L (ref 0–70)
AMPHETAMINES UR QL SCN: ABNORMAL
ANION GAP SERPL CALCULATED.3IONS-SCNC: 18 MMOL/L (ref 7–15)
ANION GAP SERPL CALCULATED.3IONS-SCNC: 20 MMOL/L (ref 7–15)
ANION GAP SERPL CALCULATED.3IONS-SCNC: ABNORMAL MMOL/L
APPEARANCE UR: CLEAR
AST SERPL W P-5'-P-CCNC: 129 U/L (ref 0–45)
BACTERIA #/AREA URNS HPF: ABNORMAL /HPF
BARBITURATES UR QL SCN: ABNORMAL
BASE EXCESS BLDV CALC-SCNC: 26.9 MMOL/L (ref -3–3)
BASE EXCESS BLDV CALC-SCNC: 28.6 MMOL/L (ref -3–3)
BASOPHILS # BLD AUTO: 0 10E3/UL (ref 0–0.2)
BASOPHILS # BLD AUTO: 0.1 10E3/UL (ref 0–0.2)
BASOPHILS NFR BLD AUTO: 0 %
BASOPHILS NFR BLD AUTO: 1 %
BENZODIAZ UR QL SCN: ABNORMAL
BILIRUB SERPL-MCNC: 3.7 MG/DL
BILIRUB UR QL STRIP: NEGATIVE
BUN SERPL-MCNC: 73.1 MG/DL (ref 6–20)
BUN SERPL-MCNC: 73.1 MG/DL (ref 6–20)
BUN SERPL-MCNC: 75.6 MG/DL (ref 6–20)
BZE UR QL SCN: ABNORMAL
CALCIUM SERPL-MCNC: 8.3 MG/DL (ref 8.8–10.4)
CALCIUM SERPL-MCNC: 8.4 MG/DL (ref 8.8–10.4)
CALCIUM SERPL-MCNC: 9.5 MG/DL (ref 8.8–10.4)
CANNABINOIDS UR QL SCN: ABNORMAL
CHLORIDE SERPL-SCNC: 64 MMOL/L (ref 98–107)
CHLORIDE SERPL-SCNC: 65 MMOL/L (ref 98–107)
CHLORIDE SERPL-SCNC: <60 MMOL/L (ref 98–107)
COLOR UR AUTO: ABNORMAL
CREAT SERPL-MCNC: 3.39 MG/DL (ref 0.67–1.17)
CREAT SERPL-MCNC: 3.55 MG/DL (ref 0.67–1.17)
CREAT SERPL-MCNC: 4.62 MG/DL (ref 0.67–1.17)
EGFRCR SERPLBLD CKD-EPI 2021: 16 ML/MIN/1.73M2
EGFRCR SERPLBLD CKD-EPI 2021: 23 ML/MIN/1.73M2
EGFRCR SERPLBLD CKD-EPI 2021: 24 ML/MIN/1.73M2
EOSINOPHIL # BLD AUTO: 0 10E3/UL (ref 0–0.7)
EOSINOPHIL # BLD AUTO: 0 10E3/UL (ref 0–0.7)
EOSINOPHIL NFR BLD AUTO: 0 %
EOSINOPHIL NFR BLD AUTO: 0 %
ERYTHROCYTE [DISTWIDTH] IN BLOOD BY AUTOMATED COUNT: 11.2 % (ref 10–15)
ERYTHROCYTE [DISTWIDTH] IN BLOOD BY AUTOMATED COUNT: 11.4 % (ref 10–15)
ETHANOL SERPL-MCNC: <0.01 G/DL
FENTANYL UR QL: ABNORMAL
GLUCOSE BLDC GLUCOMTR-MCNC: 165 MG/DL (ref 70–99)
GLUCOSE BLDC GLUCOMTR-MCNC: 182 MG/DL (ref 70–99)
GLUCOSE SERPL-MCNC: 151 MG/DL (ref 70–99)
GLUCOSE SERPL-MCNC: 159 MG/DL (ref 70–99)
GLUCOSE SERPL-MCNC: 173 MG/DL (ref 70–99)
GLUCOSE UR STRIP-MCNC: NEGATIVE MG/DL
GRANULAR CAST: 3 /LPF (ref ?–1)
HCO3 BLDV-SCNC: 55 MMOL/L (ref 21–28)
HCO3 BLDV-SCNC: 57 MMOL/L (ref 21–28)
HCO3 SERPL-SCNC: 40 MMOL/L (ref 22–29)
HCO3 SERPL-SCNC: 43 MMOL/L (ref 22–29)
HCO3 SERPL-SCNC: 44 MMOL/L (ref 22–29)
HCT VFR BLD AUTO: 44.3 % (ref 40–53)
HCT VFR BLD AUTO: 50.6 % (ref 40–53)
HGB BLD-MCNC: 16.1 G/DL (ref 13.3–17.7)
HGB BLD-MCNC: 18.5 G/DL (ref 13.3–17.7)
HGB UR QL STRIP: ABNORMAL
HYALINE CASTS: 4 /LPF
IMM GRANULOCYTES # BLD: 0.2 10E3/UL
IMM GRANULOCYTES # BLD: 0.7 10E3/UL
IMM GRANULOCYTES NFR BLD: 1 %
IMM GRANULOCYTES NFR BLD: 2 %
KETONES UR STRIP-MCNC: NEGATIVE MG/DL
LEUKOCYTE ESTERASE UR QL STRIP: NEGATIVE
LIPASE SERPL-CCNC: 127 U/L (ref 13–60)
LYMPHOCYTES # BLD AUTO: 0.9 10E3/UL (ref 0.8–5.3)
LYMPHOCYTES # BLD AUTO: 1 10E3/UL (ref 0.8–5.3)
LYMPHOCYTES NFR BLD AUTO: 3 %
LYMPHOCYTES NFR BLD AUTO: 4 %
MAGNESIUM SERPL-MCNC: 2.3 MG/DL (ref 1.7–2.3)
MAGNESIUM SERPL-MCNC: 2.8 MG/DL (ref 1.7–2.3)
MCH RBC QN AUTO: 30.8 PG (ref 26.5–33)
MCH RBC QN AUTO: 31.4 PG (ref 26.5–33)
MCHC RBC AUTO-ENTMCNC: 36.3 G/DL (ref 31.5–36.5)
MCHC RBC AUTO-ENTMCNC: 36.6 G/DL (ref 31.5–36.5)
MCV RBC AUTO: 84 FL (ref 78–100)
MCV RBC AUTO: 87 FL (ref 78–100)
MONOCYTES # BLD AUTO: 1.3 10E3/UL (ref 0–1.3)
MONOCYTES # BLD AUTO: 2.1 10E3/UL (ref 0–1.3)
MONOCYTES NFR BLD AUTO: 6 %
MONOCYTES NFR BLD AUTO: 7 %
NEUTROPHILS # BLD AUTO: 20.5 10E3/UL (ref 1.6–8.3)
NEUTROPHILS # BLD AUTO: 26.2 10E3/UL (ref 1.6–8.3)
NEUTROPHILS NFR BLD AUTO: 87 %
NEUTROPHILS NFR BLD AUTO: 90 %
NITRATE UR QL: NEGATIVE
NRBC # BLD AUTO: 0 10E3/UL
NRBC # BLD AUTO: 0 10E3/UL
NRBC BLD AUTO-RTO: 0 /100
NRBC BLD AUTO-RTO: 0 /100
NT-PROBNP SERPL-MCNC: 833 PG/ML (ref 0–93)
O2/TOTAL GAS SETTING VFR VENT: 2 %
O2/TOTAL GAS SETTING VFR VENT: 21 %
OPIATES UR QL SCN: ABNORMAL
OSMOLALITY SERPL: 296 MMOL/KG (ref 275–295)
OSMOLALITY UR: 501 MMOL/KG (ref 100–1200)
OXYHGB MFR BLDV: 62 % (ref 70–75)
OXYHGB MFR BLDV: 92 % (ref 70–75)
PCO2 BLDV: 60 MM HG (ref 40–50)
PCO2 BLDV: 63 MM HG (ref 40–50)
PCP QUAL URINE (ROCHE): ABNORMAL
PH BLDV: 7.55 [PH] (ref 7.32–7.43)
PH BLDV: 7.58 [PH] (ref 7.32–7.43)
PH UR STRIP: 7 [PH] (ref 5–7)
PHOSPHATE SERPL-MCNC: 7.7 MG/DL (ref 2.5–4.5)
PLAT MORPH BLD: NORMAL
PLATELET # BLD AUTO: 368 10E3/UL (ref 150–450)
PLATELET # BLD AUTO: 485 10E3/UL (ref 150–450)
PO2 BLDV: 35 MM HG (ref 25–47)
PO2 BLDV: 66 MM HG (ref 25–47)
POTASSIUM SERPL-SCNC: 2.5 MMOL/L (ref 3.4–5.3)
POTASSIUM SERPL-SCNC: 2.6 MMOL/L (ref 3.4–5.3)
POTASSIUM SERPL-SCNC: 3 MMOL/L (ref 3.4–5.3)
POTASSIUM SERPL-SCNC: 4.3 MMOL/L (ref 3.4–5.3)
PROT SERPL-MCNC: 9.3 G/DL (ref 6.4–8.3)
RBC # BLD AUTO: 5.12 10E6/UL (ref 4.4–5.9)
RBC # BLD AUTO: 6.01 10E6/UL (ref 4.4–5.9)
RBC MORPH BLD: NORMAL
RBC URINE: 1 /HPF
SAO2 % BLDV: 63.1 % (ref 70–75)
SAO2 % BLDV: 92.9 % (ref 70–75)
SODIUM SERPL-SCNC: 127 MMOL/L (ref 135–145)
SODIUM SERPL-SCNC: 127 MMOL/L (ref 135–145)
SODIUM SERPL-SCNC: 130 MMOL/L (ref 135–145)
SODIUM UR-SCNC: 40 MMOL/L
SP GR UR STRIP: 1.02 (ref 1–1.03)
SQUAMOUS EPITHELIAL: <1 /HPF
TROPONIN T SERPL HS-MCNC: 44 NG/L
TROPONIN T SERPL HS-MCNC: 58 NG/L
TSH SERPL DL<=0.005 MIU/L-ACNC: 0.67 UIU/ML (ref 0.3–4.2)
UROBILINOGEN UR STRIP-MCNC: NORMAL MG/DL
WBC # BLD AUTO: 22.8 10E3/UL (ref 4–11)
WBC # BLD AUTO: 30.1 10E3/UL (ref 4–11)
WBC URINE: 1 /HPF

## 2025-07-19 PROCEDURE — 85025 COMPLETE CBC W/AUTO DIFF WBC: CPT | Performed by: EMERGENCY MEDICINE

## 2025-07-19 PROCEDURE — 96375 TX/PRO/DX INJ NEW DRUG ADDON: CPT

## 2025-07-19 PROCEDURE — 99291 CRITICAL CARE FIRST HOUR: CPT | Mod: 25

## 2025-07-19 PROCEDURE — 84443 ASSAY THYROID STIM HORMONE: CPT | Performed by: EMERGENCY MEDICINE

## 2025-07-19 PROCEDURE — 250N000013 HC RX MED GY IP 250 OP 250 PS 637: Performed by: STUDENT IN AN ORGANIZED HEALTH CARE EDUCATION/TRAINING PROGRAM

## 2025-07-19 PROCEDURE — 99223 1ST HOSP IP/OBS HIGH 75: CPT | Performed by: PHYSICIAN ASSISTANT

## 2025-07-19 PROCEDURE — 258N000003 HC RX IP 258 OP 636: Performed by: EMERGENCY MEDICINE

## 2025-07-19 PROCEDURE — 250N000011 HC RX IP 250 OP 636: Performed by: EMERGENCY MEDICINE

## 2025-07-19 PROCEDURE — 93010 ELECTROCARDIOGRAM REPORT: CPT | Performed by: EMERGENCY MEDICINE

## 2025-07-19 PROCEDURE — 84132 ASSAY OF SERUM POTASSIUM: CPT | Performed by: EMERGENCY MEDICINE

## 2025-07-19 PROCEDURE — 84100 ASSAY OF PHOSPHORUS: CPT | Performed by: EMERGENCY MEDICINE

## 2025-07-19 PROCEDURE — 250N000013 HC RX MED GY IP 250 OP 250 PS 637: Performed by: PHYSICIAN ASSISTANT

## 2025-07-19 PROCEDURE — 99285 EMERGENCY DEPT VISIT HI MDM: CPT | Mod: 25 | Performed by: EMERGENCY MEDICINE

## 2025-07-19 PROCEDURE — 93005 ELECTROCARDIOGRAM TRACING: CPT | Mod: 76

## 2025-07-19 PROCEDURE — 258N000003 HC RX IP 258 OP 636: Performed by: PHYSICIAN ASSISTANT

## 2025-07-19 PROCEDURE — 84075 ASSAY ALKALINE PHOSPHATASE: CPT | Performed by: EMERGENCY MEDICINE

## 2025-07-19 PROCEDURE — 83735 ASSAY OF MAGNESIUM: CPT | Performed by: STUDENT IN AN ORGANIZED HEALTH CARE EDUCATION/TRAINING PROGRAM

## 2025-07-19 PROCEDURE — 36415 COLL VENOUS BLD VENIPUNCTURE: CPT | Performed by: STUDENT IN AN ORGANIZED HEALTH CARE EDUCATION/TRAINING PROGRAM

## 2025-07-19 PROCEDURE — 120N000013 HC R&B IMCU

## 2025-07-19 PROCEDURE — 250N000011 HC RX IP 250 OP 636: Performed by: PHYSICIAN ASSISTANT

## 2025-07-19 PROCEDURE — 74176 CT ABD & PELVIS W/O CONTRAST: CPT

## 2025-07-19 PROCEDURE — 82565 ASSAY OF CREATININE: CPT | Performed by: EMERGENCY MEDICINE

## 2025-07-19 PROCEDURE — 99291 CRITICAL CARE FIRST HOUR: CPT | Performed by: EMERGENCY MEDICINE

## 2025-07-19 PROCEDURE — 83735 ASSAY OF MAGNESIUM: CPT | Performed by: EMERGENCY MEDICINE

## 2025-07-19 PROCEDURE — 85025 COMPLETE CBC W/AUTO DIFF WBC: CPT | Performed by: STUDENT IN AN ORGANIZED HEALTH CARE EDUCATION/TRAINING PROGRAM

## 2025-07-19 PROCEDURE — 250N000013 HC RX MED GY IP 250 OP 250 PS 637: Performed by: EMERGENCY MEDICINE

## 2025-07-19 PROCEDURE — 82805 BLOOD GASES W/O2 SATURATION: CPT | Performed by: STUDENT IN AN ORGANIZED HEALTH CARE EDUCATION/TRAINING PROGRAM

## 2025-07-19 PROCEDURE — 83690 ASSAY OF LIPASE: CPT | Performed by: EMERGENCY MEDICINE

## 2025-07-19 PROCEDURE — 83880 ASSAY OF NATRIURETIC PEPTIDE: CPT | Performed by: EMERGENCY MEDICINE

## 2025-07-19 PROCEDURE — 83930 ASSAY OF BLOOD OSMOLALITY: CPT | Performed by: EMERGENCY MEDICINE

## 2025-07-19 PROCEDURE — 81001 URINALYSIS AUTO W/SCOPE: CPT | Performed by: PHYSICIAN ASSISTANT

## 2025-07-19 PROCEDURE — 96374 THER/PROPH/DIAG INJ IV PUSH: CPT | Mod: 59

## 2025-07-19 PROCEDURE — 71045 X-RAY EXAM CHEST 1 VIEW: CPT

## 2025-07-19 PROCEDURE — 83935 ASSAY OF URINE OSMOLALITY: CPT | Performed by: PHYSICIAN ASSISTANT

## 2025-07-19 PROCEDURE — 84484 ASSAY OF TROPONIN QUANT: CPT | Performed by: EMERGENCY MEDICINE

## 2025-07-19 PROCEDURE — 96361 HYDRATE IV INFUSION ADD-ON: CPT

## 2025-07-19 PROCEDURE — HZ2ZZZZ DETOXIFICATION SERVICES FOR SUBSTANCE ABUSE TREATMENT: ICD-10-PCS | Performed by: EMERGENCY MEDICINE

## 2025-07-19 PROCEDURE — 82435 ASSAY OF BLOOD CHLORIDE: CPT | Performed by: STUDENT IN AN ORGANIZED HEALTH CARE EDUCATION/TRAINING PROGRAM

## 2025-07-19 PROCEDURE — 250N000009 HC RX 250: Performed by: EMERGENCY MEDICINE

## 2025-07-19 PROCEDURE — 36415 COLL VENOUS BLD VENIPUNCTURE: CPT | Performed by: EMERGENCY MEDICINE

## 2025-07-19 PROCEDURE — 84300 ASSAY OF URINE SODIUM: CPT | Performed by: PHYSICIAN ASSISTANT

## 2025-07-19 PROCEDURE — 82805 BLOOD GASES W/O2 SATURATION: CPT | Performed by: EMERGENCY MEDICINE

## 2025-07-19 PROCEDURE — 80307 DRUG TEST PRSMV CHEM ANLYZR: CPT | Performed by: PHYSICIAN ASSISTANT

## 2025-07-19 PROCEDURE — 93005 ELECTROCARDIOGRAM TRACING: CPT

## 2025-07-19 PROCEDURE — 82077 ASSAY SPEC XCP UR&BREATH IA: CPT | Performed by: EMERGENCY MEDICINE

## 2025-07-19 PROCEDURE — 82962 GLUCOSE BLOOD TEST: CPT

## 2025-07-19 RX ORDER — QUETIAPINE FUMARATE 25 MG/1
25 TABLET, FILM COATED ORAL AT BEDTIME
Status: DISCONTINUED | OUTPATIENT
Start: 2025-07-19 | End: 2025-07-22 | Stop reason: HOSPADM

## 2025-07-19 RX ORDER — PROCHLORPERAZINE MALEATE 5 MG/1
10 TABLET ORAL EVERY 6 HOURS PRN
Status: DISCONTINUED | OUTPATIENT
Start: 2025-07-19 | End: 2025-07-22 | Stop reason: HOSPADM

## 2025-07-19 RX ORDER — ONDANSETRON 4 MG/1
4 TABLET, ORALLY DISINTEGRATING ORAL EVERY 6 HOURS PRN
Status: DISCONTINUED | OUTPATIENT
Start: 2025-07-19 | End: 2025-07-22 | Stop reason: HOSPADM

## 2025-07-19 RX ORDER — GABAPENTIN 300 MG/1
300 CAPSULE ORAL EVERY 8 HOURS
Status: DISCONTINUED | OUTPATIENT
Start: 2025-07-25 | End: 2025-07-20

## 2025-07-19 RX ORDER — LORAZEPAM 1 MG/1
1-2 TABLET ORAL EVERY 30 MIN PRN
Status: DISCONTINUED | OUTPATIENT
Start: 2025-07-19 | End: 2025-07-19

## 2025-07-19 RX ORDER — DIAZEPAM 5 MG/1
10 TABLET ORAL EVERY 30 MIN PRN
Status: DISCONTINUED | OUTPATIENT
Start: 2025-07-19 | End: 2025-07-22 | Stop reason: HOSPADM

## 2025-07-19 RX ORDER — LIDOCAINE 40 MG/G
CREAM TOPICAL
Status: DISCONTINUED | OUTPATIENT
Start: 2025-07-19 | End: 2025-07-21

## 2025-07-19 RX ORDER — SODIUM CHLORIDE 9 MG/ML
INJECTION, SOLUTION INTRAVENOUS CONTINUOUS
Status: DISCONTINUED | OUTPATIENT
Start: 2025-07-19 | End: 2025-07-19

## 2025-07-19 RX ORDER — CALCIUM CARBONATE 500 MG/1
1000 TABLET, CHEWABLE ORAL 4 TIMES DAILY PRN
Status: DISCONTINUED | OUTPATIENT
Start: 2025-07-19 | End: 2025-07-22 | Stop reason: HOSPADM

## 2025-07-19 RX ORDER — POTASSIUM CHLORIDE 1500 MG/1
40 TABLET, EXTENDED RELEASE ORAL ONCE
Status: COMPLETED | OUTPATIENT
Start: 2025-07-19 | End: 2025-07-19

## 2025-07-19 RX ORDER — GABAPENTIN 600 MG/1
1200 TABLET ORAL ONCE
Status: COMPLETED | OUTPATIENT
Start: 2025-07-19 | End: 2025-07-19

## 2025-07-19 RX ORDER — SODIUM CHLORIDE, SODIUM LACTATE, POTASSIUM CHLORIDE, CALCIUM CHLORIDE 600; 310; 30; 20 MG/100ML; MG/100ML; MG/100ML; MG/100ML
INJECTION, SOLUTION INTRAVENOUS CONTINUOUS
Status: DISCONTINUED | OUTPATIENT
Start: 2025-07-19 | End: 2025-07-22 | Stop reason: HOSPADM

## 2025-07-19 RX ORDER — GABAPENTIN 300 MG/1
900 CAPSULE ORAL EVERY 8 HOURS
Status: DISCONTINUED | OUTPATIENT
Start: 2025-07-20 | End: 2025-07-20

## 2025-07-19 RX ORDER — GABAPENTIN 300 MG/1
600 CAPSULE ORAL EVERY 8 HOURS
Status: DISCONTINUED | OUTPATIENT
Start: 2025-07-23 | End: 2025-07-20

## 2025-07-19 RX ORDER — FOLIC ACID 1 MG/1
1 TABLET ORAL DAILY
Status: DISCONTINUED | OUTPATIENT
Start: 2025-07-22 | End: 2025-07-22 | Stop reason: HOSPADM

## 2025-07-19 RX ORDER — IBUPROFEN 200 MG
400 TABLET ORAL EVERY 6 HOURS PRN
Status: ON HOLD | COMMUNITY
End: 2025-07-22

## 2025-07-19 RX ORDER — METOPROLOL TARTRATE 1 MG/ML
5 INJECTION, SOLUTION INTRAVENOUS EVERY 6 HOURS PRN
Status: DISCONTINUED | OUTPATIENT
Start: 2025-07-19 | End: 2025-07-22 | Stop reason: HOSPADM

## 2025-07-19 RX ORDER — DILTIAZEM HYDROCHLORIDE 5 MG/ML
25 INJECTION INTRAVENOUS ONCE
Status: COMPLETED | OUTPATIENT
Start: 2025-07-19 | End: 2025-07-19

## 2025-07-19 RX ORDER — SALIVA STIMULANT COMB. NO.3
1 SPRAY, NON-AEROSOL (ML) MUCOUS MEMBRANE 4 TIMES DAILY PRN
Status: DISCONTINUED | OUTPATIENT
Start: 2025-07-19 | End: 2025-07-22 | Stop reason: HOSPADM

## 2025-07-19 RX ORDER — AMOXICILLIN 250 MG
1 CAPSULE ORAL 2 TIMES DAILY PRN
Status: DISCONTINUED | OUTPATIENT
Start: 2025-07-19 | End: 2025-07-22 | Stop reason: HOSPADM

## 2025-07-19 RX ORDER — THIAMINE HYDROCHLORIDE 100 MG/ML
200 INJECTION, SOLUTION INTRAMUSCULAR; INTRAVENOUS 3 TIMES DAILY
Status: COMPLETED | OUTPATIENT
Start: 2025-07-19 | End: 2025-07-21

## 2025-07-19 RX ORDER — MULTIPLE VITAMINS W/ MINERALS TAB 9MG-400MCG
1 TAB ORAL DAILY
Status: DISCONTINUED | OUTPATIENT
Start: 2025-07-19 | End: 2025-07-19

## 2025-07-19 RX ORDER — METOPROLOL TARTRATE 1 MG/ML
5 INJECTION, SOLUTION INTRAVENOUS ONCE
Status: COMPLETED | OUTPATIENT
Start: 2025-07-19 | End: 2025-07-19

## 2025-07-19 RX ORDER — FOLIC ACID 5 MG/ML
1 INJECTION, SOLUTION INTRAMUSCULAR; INTRAVENOUS; SUBCUTANEOUS ONCE
Status: COMPLETED | OUTPATIENT
Start: 2025-07-19 | End: 2025-07-19

## 2025-07-19 RX ORDER — CLONIDINE HYDROCHLORIDE 0.1 MG/1
0.1 TABLET ORAL EVERY 8 HOURS
Status: DISCONTINUED | OUTPATIENT
Start: 2025-07-19 | End: 2025-07-19

## 2025-07-19 RX ORDER — OLANZAPINE 10 MG/1
5 INJECTION, POWDER, LYOPHILIZED, FOR SOLUTION INTRAMUSCULAR ONCE
Status: COMPLETED | OUTPATIENT
Start: 2025-07-19 | End: 2025-07-19

## 2025-07-19 RX ORDER — FOLIC ACID 1 MG/1
1 TABLET ORAL DAILY
Status: DISCONTINUED | OUTPATIENT
Start: 2025-07-19 | End: 2025-07-19

## 2025-07-19 RX ORDER — ONDANSETRON 2 MG/ML
4 INJECTION INTRAMUSCULAR; INTRAVENOUS EVERY 6 HOURS PRN
Status: DISCONTINUED | OUTPATIENT
Start: 2025-07-19 | End: 2025-07-22 | Stop reason: HOSPADM

## 2025-07-19 RX ORDER — LIDOCAINE 40 MG/G
CREAM TOPICAL
Status: DISCONTINUED | OUTPATIENT
Start: 2025-07-19 | End: 2025-07-22 | Stop reason: HOSPADM

## 2025-07-19 RX ORDER — ONDANSETRON 2 MG/ML
4 INJECTION INTRAMUSCULAR; INTRAVENOUS EVERY 30 MIN PRN
Status: DISCONTINUED | OUTPATIENT
Start: 2025-07-19 | End: 2025-07-19

## 2025-07-19 RX ORDER — HALOPERIDOL 5 MG/ML
2.5-5 INJECTION INTRAMUSCULAR EVERY 4 HOURS PRN
Status: DISCONTINUED | OUTPATIENT
Start: 2025-07-19 | End: 2025-07-22 | Stop reason: HOSPADM

## 2025-07-19 RX ORDER — FLUMAZENIL 0.1 MG/ML
0.2 INJECTION, SOLUTION INTRAVENOUS
Status: DISCONTINUED | OUTPATIENT
Start: 2025-07-19 | End: 2025-07-22 | Stop reason: HOSPADM

## 2025-07-19 RX ORDER — DIAZEPAM 10 MG/2ML
5-10 INJECTION, SOLUTION INTRAMUSCULAR; INTRAVENOUS EVERY 30 MIN PRN
Status: DISCONTINUED | OUTPATIENT
Start: 2025-07-19 | End: 2025-07-22 | Stop reason: HOSPADM

## 2025-07-19 RX ORDER — POTASSIUM CHLORIDE 1500 MG/1
20 TABLET, EXTENDED RELEASE ORAL ONCE
Status: COMPLETED | OUTPATIENT
Start: 2025-07-19 | End: 2025-07-19

## 2025-07-19 RX ORDER — MULTIPLE VITAMINS W/ MINERALS TAB 9MG-400MCG
1 TAB ORAL DAILY
Status: DISCONTINUED | OUTPATIENT
Start: 2025-07-19 | End: 2025-07-22 | Stop reason: HOSPADM

## 2025-07-19 RX ORDER — MAGNESIUM HYDROXIDE/ALUMINUM HYDROXICE/SIMETHICONE 120; 1200; 1200 MG/30ML; MG/30ML; MG/30ML
15 SUSPENSION ORAL ONCE
Status: COMPLETED | OUTPATIENT
Start: 2025-07-19 | End: 2025-07-19

## 2025-07-19 RX ORDER — AMOXICILLIN 250 MG
2 CAPSULE ORAL 2 TIMES DAILY PRN
Status: DISCONTINUED | OUTPATIENT
Start: 2025-07-19 | End: 2025-07-22 | Stop reason: HOSPADM

## 2025-07-19 RX ORDER — FOLIC ACID 5 MG/ML
1 INJECTION, SOLUTION INTRAMUSCULAR; INTRAVENOUS; SUBCUTANEOUS DAILY
Status: COMPLETED | OUTPATIENT
Start: 2025-07-20 | End: 2025-07-21

## 2025-07-19 RX ORDER — GABAPENTIN 100 MG/1
100 CAPSULE ORAL EVERY 8 HOURS
Status: DISCONTINUED | OUTPATIENT
Start: 2025-07-27 | End: 2025-07-20

## 2025-07-19 RX ADMIN — PROCHLORPERAZINE MALEATE 10 MG: 5 TABLET ORAL at 20:15

## 2025-07-19 RX ADMIN — Medication 100 MG: at 15:34

## 2025-07-19 RX ADMIN — Medication 1 TABLET: at 15:34

## 2025-07-19 RX ADMIN — FOLIC ACID 1 MG: 5 INJECTION, SOLUTION INTRAMUSCULAR; INTRAVENOUS; SUBCUTANEOUS at 20:48

## 2025-07-19 RX ADMIN — FOLIC ACID 1 MG: 1 TABLET ORAL at 15:34

## 2025-07-19 RX ADMIN — PANTOPRAZOLE SODIUM 40 MG: 40 INJECTION, POWDER, LYOPHILIZED, FOR SOLUTION INTRAVENOUS at 20:15

## 2025-07-19 RX ADMIN — Medication 1 TABLET: at 20:15

## 2025-07-19 RX ADMIN — OLANZAPINE 5 MG: 10 INJECTION, POWDER, FOR SOLUTION INTRAMUSCULAR at 10:28

## 2025-07-19 RX ADMIN — SODIUM CHLORIDE, POTASSIUM CHLORIDE, SODIUM LACTATE AND CALCIUM CHLORIDE 1000 ML: 600; 310; 30; 20 INJECTION, SOLUTION INTRAVENOUS at 20:16

## 2025-07-19 RX ADMIN — SODIUM CHLORIDE, POTASSIUM CHLORIDE, SODIUM LACTATE AND CALCIUM CHLORIDE 1000 ML: 600; 310; 30; 20 INJECTION, SOLUTION INTRAVENOUS at 21:45

## 2025-07-19 RX ADMIN — ONDANSETRON 4 MG: 2 INJECTION, SOLUTION INTRAMUSCULAR; INTRAVENOUS at 17:42

## 2025-07-19 RX ADMIN — POTASSIUM CHLORIDE 40 MEQ: 1500 TABLET, EXTENDED RELEASE ORAL at 16:27

## 2025-07-19 RX ADMIN — SODIUM CHLORIDE: 0.9 INJECTION, SOLUTION INTRAVENOUS at 11:28

## 2025-07-19 RX ADMIN — METOPROLOL TARTRATE 5 MG: 5 INJECTION INTRAVENOUS at 10:33

## 2025-07-19 RX ADMIN — ALUMINUM HYDROXIDE, MAGNESIUM HYDROXIDE, AND SIMETHICONE 15 ML: 200; 200; 20 SUSPENSION ORAL at 17:09

## 2025-07-19 RX ADMIN — POTASSIUM CHLORIDE 40 MEQ: 1500 TABLET, EXTENDED RELEASE ORAL at 21:44

## 2025-07-19 RX ADMIN — LORAZEPAM 1 MG: 1 TABLET ORAL at 12:20

## 2025-07-19 RX ADMIN — THIAMINE HYDROCHLORIDE 200 MG: 100 INJECTION, SOLUTION INTRAMUSCULAR; INTRAVENOUS at 20:16

## 2025-07-19 RX ADMIN — ONDANSETRON 4 MG: 2 INJECTION, SOLUTION INTRAMUSCULAR; INTRAVENOUS at 10:52

## 2025-07-19 RX ADMIN — SODIUM CHLORIDE 1000 ML: 0.9 INJECTION, SOLUTION INTRAVENOUS at 10:18

## 2025-07-19 RX ADMIN — SODIUM CHLORIDE, POTASSIUM CHLORIDE, SODIUM LACTATE AND CALCIUM CHLORIDE: 600; 310; 30; 20 INJECTION, SOLUTION INTRAVENOUS at 20:16

## 2025-07-19 RX ADMIN — QUETIAPINE FUMARATE 25 MG: 25 TABLET ORAL at 21:44

## 2025-07-19 RX ADMIN — LORAZEPAM 2 MG: 1 TABLET ORAL at 11:15

## 2025-07-19 RX ADMIN — POTASSIUM CHLORIDE 20 MEQ: 1500 TABLET, EXTENDED RELEASE ORAL at 23:12

## 2025-07-19 RX ADMIN — DILTIAZEM HYDROCHLORIDE 25 MG: 5 INJECTION, SOLUTION INTRAVENOUS at 10:20

## 2025-07-19 RX ADMIN — GABAPENTIN 1200 MG: 600 TABLET, FILM COATED ORAL at 20:15

## 2025-07-19 RX ADMIN — CALCIUM CARBONATE (ANTACID) CHEW TAB 500 MG 1000 MG: 500 CHEW TAB at 20:14

## 2025-07-19 ASSESSMENT — ACTIVITIES OF DAILY LIVING (ADL)
ADLS_ACUITY_SCORE: 19
DIFFICULTY_COMMUNICATING: NO
ADLS_ACUITY_SCORE: 41
ADLS_ACUITY_SCORE: 41
ADLS_ACUITY_SCORE: 19
ADLS_ACUITY_SCORE: 41
ADLS_ACUITY_SCORE: 41
DIFFICULTY_EATING/SWALLOWING: NO
ADLS_ACUITY_SCORE: 41
WALKING_OR_CLIMBING_STAIRS_DIFFICULTY: NO
TOILETING_ISSUES: NO
ADLS_ACUITY_SCORE: 19
CONCENTRATING,_REMEMBERING_OR_MAKING_DECISIONS_DIFFICULTY: NO
DOING_ERRANDS_INDEPENDENTLY_DIFFICULTY: NO
CHANGE_IN_FUNCTIONAL_STATUS_SINCE_ONSET_OF_CURRENT_ILLNESS/INJURY: NO
ADLS_ACUITY_SCORE: 41
WEAR_GLASSES_OR_BLIND: NO
HEARING_DIFFICULTY_OR_DEAF: NO
ADLS_ACUITY_SCORE: 19
ADLS_ACUITY_SCORE: 41
ADLS_ACUITY_SCORE: 41
ADLS_ACUITY_SCORE: 19
DRESSING/BATHING_DIFFICULTY: NO
ADLS_ACUITY_SCORE: 19
FALL_HISTORY_WITHIN_LAST_SIX_MONTHS: NO

## 2025-07-19 ASSESSMENT — LIFESTYLE VARIABLES
HEADACHE, FULLNESS IN HEAD: NOT PRESENT
NAUSEA AND VOMITING: NO NAUSEA AND NO VOMITING
HEADACHE, FULLNESS IN HEAD: NOT PRESENT
AUDITORY DISTURBANCES: NOT PRESENT
PAROXYSMAL SWEATS: BARELY PERCEPTIBLE SWEATING, PALMS MOIST
ORIENTATION AND CLOUDING OF SENSORIUM: ORIENTED AND CAN DO SERIAL ADDITIONS
TOTAL SCORE: 20
ANXIETY: MODERATELY ANXIOUS, OR GUARDED, SO ANXIETY IS INFERRED
AGITATION: SOMEWHAT MORE THAN NORMAL ACTIVITY
NAUSEA AND VOMITING: CONSTANT NAUSEA, FREQUENT DRY HEAVES AND VOMITING
PAROXYSMAL SWEATS: BARELY PERCEPTIBLE SWEATING, PALMS MOIST
PAROXYSMAL SWEATS: BARELY PERCEPTIBLE SWEATING, PALMS MOIST
ANXIETY: MODERATELY ANXIOUS, OR GUARDED, SO ANXIETY IS INFERRED
HEADACHE, FULLNESS IN HEAD: NOT PRESENT
AGITATION: SOMEWHAT MORE THAN NORMAL ACTIVITY
AUDITORY DISTURBANCES: NOT PRESENT
AUDITORY DISTURBANCES: NOT PRESENT
NAUSEA AND VOMITING: NO NAUSEA AND NO VOMITING
TREMOR: NOT VISIBLE, BUT CAN BE FELT FINGERTIP TO FINGERTIP
ORIENTATION AND CLOUDING OF SENSORIUM: ORIENTED AND CAN DO SERIAL ADDITIONS
TREMOR: NOT VISIBLE, BUT CAN BE FELT FINGERTIP TO FINGERTIP
PAROXYSMAL SWEATS: BARELY PERCEPTIBLE SWEATING, PALMS MOIST
ANXIETY: MILDLY ANXIOUS
VISUAL DISTURBANCES: NOT PRESENT
HEADACHE, FULLNESS IN HEAD: NOT PRESENT
ORIENTATION AND CLOUDING OF SENSORIUM: ORIENTED AND CAN DO SERIAL ADDITIONS
TOTAL SCORE: 8
TREMOR: NOT VISIBLE, BUT CAN BE FELT FINGERTIP TO FINGERTIP
ORIENTATION AND CLOUDING OF SENSORIUM: ORIENTED AND CAN DO SERIAL ADDITIONS
VISUAL DISTURBANCES: NOT PRESENT
ANXIETY: MILDLY ANXIOUS
TOTAL SCORE: 4
AUDITORY DISTURBANCES: NOT PRESENT
NAUSEA AND VOMITING: MILD NAUSEA WITH NO VOMITING
VISUAL DISTURBANCES: NOT PRESENT
TOTAL SCORE: 4
TREMOR: MODERATE, WITH PATIENT'S ARMS EXTENDED
VISUAL DISTURBANCES: NOT PRESENT
AGITATION: SOMEWHAT MORE THAN NORMAL ACTIVITY
AGITATION: MODERATELY FIDGETY AND RESTLESS

## 2025-07-19 ASSESSMENT — COLUMBIA-SUICIDE SEVERITY RATING SCALE - C-SSRS
2. HAVE YOU ACTUALLY HAD ANY THOUGHTS OF KILLING YOURSELF IN THE PAST MONTH?: NO
6. HAVE YOU EVER DONE ANYTHING, STARTED TO DO ANYTHING, OR PREPARED TO DO ANYTHING TO END YOUR LIFE?: NO
1. IN THE PAST MONTH, HAVE YOU WISHED YOU WERE DEAD OR WISHED YOU COULD GO TO SLEEP AND NOT WAKE UP?: NO

## 2025-07-19 NOTE — ED TRIAGE NOTES
Pt has had 750 ml hard ETOH every day for past 3 days.  Pt started vomiting yesterday.  Denies abdominal pain.  Pt clenched hands, anxious and c/o cp.  Rapid heart rate in 180's.  MD called to room.     Triage Assessment (Adult)       Row Name 07/19/25 1004          Triage Assessment    Airway WDL WDL        Respiratory WDL    Respiratory WDL WDL        Cognitive/Neuro/Behavioral WDL    Cognitive/Neuro/Behavioral WDL WDL

## 2025-07-19 NOTE — H&P
[facility]    History and Physical - Hospitalist Service       Date of Admission:  7/19/2025    Assessment & Plan      Brenden Bishop is a 29 year old male admitted on 7/19/2025. He has a past medical history significant for alcohol use disorder, bipolar 1, ADHD, anxiety, and depression who presented to the emergency department for evaluation of intractable nausea and vomiting in the setting of recent alcohol binge and was found to have an acute kidney injury, metabolic alkalosis and respiratory acidosis, A-fib RVR, and multiple other metabolic derangements.    Acute kidney injury  Admit creatinine 4.62 (baseline 0.9 - 1.1), GFR 16 (baseline > 60). Occurs in the setting of severe nausea and vomiting as well as alcohol binge, suspect prerenal due to severe dehydration. Is making urine, renal function gradually improving after initiation of IV fluids. No acute indication for dialysis at this time.  - Additional LR bolus, followed by LR @ 150 ml/hr  - BMP in am     Alcohol use disorder  Alcohol withdrawal syndrome  Not a daily drinker, but does binge drink heavily. Last period of sobriety lasted about 3 months, then started drinking about 3 days ago. Has been drinking a fifth of vodka daily x 3 days, last drink evening of 7/18, the developed intractable vomiting and unable to drink more. Started getting tremulous and anxious morning 7/19, no seizures. Started on CIWA in the emergency department.   - Monitor on CIWA with Valium available  - Gabapentin taper  - No clonidine ordered  - MVI, thiamine, folic acid    Metabolic alkalosis with respiratory acidosis  Admission VBG appears to be mixed metabolic alkalosis with compensatory respiratory acidosis - pH 7.58 / pCO2 60 / bicarb 57. No wheezes on exam to suggest COPD / asthma exacerbation driving the pCO2 elevation. High bicarb possibly due to renal function, compensated with CO2 retention?   - Rehydrate  - Repeat VBG in am     Electrolyte  "abnormalities -   Hypokalemia   Hypermagnesemia   Hyperphosphatemia  Hypochloremia  Admission K WNL (4.3), but then dropped to 2.6.   Mg WNL (2.3) ?  2.8 (high)  Phos high (7.7).   Chloride <60 ?  64 ?  65 (low)  Multiple electrolyte abnormalities likely due to some losses from nausea / vomiting and poor oral intake.   - Repeat electrolytes again in am     Leukocytosis   Thrombocytosis   Admission WBC 30.1, hemoglobin 18.5, platelets 485. UA and chest x-ray not suggestive of infection.     CT abdomen & pelvis - \"1.  No acute pathology identified in abdomen or pelvis.  2.  Normal noncontrast appearance of the pancreas. No evidence of acute pancreatitis.  3.  Tiny sliding-type hiatal hernia of doubtful clinical significance.\"    Likely hemo concentrated in the setting of severe dehydration.  - Rehydrate and recheck CBC tonight, again in am   - Observe off antibiotics, monitor for fever    Atrial fibrillation with RVR, resolved  Elevated troponin  Elevated brain natriuretic peptide (BNP) level  Presented with heart rate as high as 190, EKG with new atrial fibrillation RVR. Was given diltiazem 25 mg and metoprolol 25 mg as well as IV fluids, patient spontaneously converted to NSR. Atrial fibrillation likely provoked by profound dehydration, alcohol, and electrolyte abnormalities.   - Monitor on telemetry  - No anticoagulation at this time    Intractable nausea and vomiting, suspect alcoholic gastritis  Acute onset of intractable nausea and vomiting in the setting of alcohol binge. Non-bloody emesis. Likely has alcoholic gastritis.   - IV Protonix    Bipolar 1 disorder  Major depressive disorder, recurrent, in partial remission  Panic attacks  Managed prior to admission with Seroquel 25 mg qhs, continue.             Diet: Advance Diet as Tolerated: Clear Liquid Diet    DVT Prophylaxis: Pneumatic Compression Devices and Ambulate every shift  [place] Catheter: Not present  Lines: None     Cardiac Monitoring: ACTIVE order. " "Indication: Electrolyte Imbalance (24 hours)- Magnesium <1.3 mg/ml; Potassium < =2.8 or > 5.5 mg/ml  Code Status: Full Code      Clinically Significant Risk Factors Present on Admission        # Hypokalemia: Lowest K = 2.5 mmol/L in last 2 days, will replace as needed  # Hyponatremia: Lowest Na = 127 mmol/L in last 2 days, will monitor as appropriate  # Hypochloremia: Lowest Cl = 54 mmol/L in last 2 days, will monitor as appropriate  # Hypocalcemia: Lowest Ca = 8.3 mg/dL in last 2 days, will monitor and replace as appropriate    # Anion Gap Metabolic Acidosis: Highest Anion Gap = 20 mmol/L in last 2 days, will monitor and treat as appropriate                    # Asthma: noted on problem list        Disposition Plan     Medically Ready for Discharge: Anticipated in 2-4 Days         The patient's care was discussed with the Attending Physician, Dr. Javier Grajeda and Patient.    Luci Burns PA-C  Hospitalist Service  [facility]  Securely message with [employer] (more info)  Text page via [employer] Paging/Directory     ______________________________________________________________________    Chief Complaint   \" I was binge drinking and then I started throwing up.\"    History is obtained from the patient, review of EMR, and emergency department sign out from Dr. Ade Bear.    History of Present Illness   Brenden Bishop is a 29 year old male with a past medical history significant for alcohol use disorder, bipolar 1, ADHD, anxiety, and depression who presented to the emergency department for evaluation of intractable nausea and vomiting in the setting of recent alcohol binge and was found to have an acute kidney injury, metabolic alkalosis and respiratory acidosis, A-fib RVR, and multiple other metabolic derangements.    Patient has a known history of binge drinking and had been sober for the last 3 months.  About 3 days ago he started binge drinking, which he says was due to " boredom.  He has been drinking about 1/5 of vodka every day for the past 3 days, last drink was yesterday evening 7/18.  Shortly after he started vomiting, and has had intractable nausea and vomiting ever since.  He estimates he vomited at least 50 times since onset of symptoms, also had diarrhea x 1.  He was up all night vomiting, this morning started to develop symptoms of withdrawal, was anxious, shaky, short of breath, and had some chest discomfort and a racing heart.  He presented to the emergency department where he was found to have new onset A-fib RVR as well as multiple metabolic abnormalities and alcohol withdrawal.    He says that his breathing and palpitations have resolved after initiation of treatment.    He is still feeling nauseated but has not vomited in a little while.  He is urinating normally, no dysuria.  Denies headache, dizziness, lightheadedness, syncope or loss of consciousness.    He is feeling generally weak.  The remainder review of systems is negative.      Past Medical History    Past Medical History:   Diagnosis Date    Alcohol use disorder 09/25/2011    Problem list name updated by automated process. Provider to review      Alcohol-induced acute pancreatitis 06/21/2022    Amphetamine use disorder, severe, dependence (H) 03/25/2022    Formatting of this note might be different from the original.  AMS, found c crushed adderall and DEBORAH >0.6.      Anxiety state 09/25/2011    Problem list name updated by automated process. Provider to review      Asthma, cough variant 06/07/2010    Bipolar 1 disorder (H) 06/09/2014    Cannabis use disorder, mild, abuse 09/25/2011    Problem list name updated by automated process. Provider to review      Major depressive disorder, recurrent, in partial remission 12/23/2016    PTSD (post-traumatic stress disorder) 01/20/2017    Social anxiety disorder 12/23/2016    Suicide attempt (H) 06/07/2014       Past Surgical History   Past Surgical History:    Procedure Laterality Date    ORTHOPEDIC SURGERY      wrist surg        Prior to Admission Medications   Prior to Admission Medications   Prescriptions Last Dose Informant Patient Reported? Taking?   QUEtiapine (SEROQUEL) 25 MG tablet Past Week  No Yes   Sig: Take 1 tablet (25 mg) by mouth At Bedtime   ibuprofen (ADVIL/MOTRIN) 200 MG tablet Past Month  Yes Yes   Sig: Take 400 mg by mouth every 6 hours as needed for moderate pain.      Facility-Administered Medications: None        Review of Systems    The 10 point Review of Systems is negative other than noted in the HPI or here.     Social History   I have reviewed this patient's social history and updated it with pertinent information if needed.  Social History     Tobacco Use    Smoking status: Former    Smokeless tobacco: Current   Substance Use Topics    Alcohol use: Yes     Comment: 1 liter daily    Drug use: Yes     Types: Marijuana        Physical Exam   Vital Signs: Temp: 98.6  F (37  C) Temp src: Oral BP: (!) 159/108 Pulse: 86   Resp: 22 SpO2: 93 % O2 Device: None (Room air)    Weight: 166 lbs 7.16 oz    Constitutional: Initially asleep but wakes to gentle physical stimuli. Oriented, cooperative. Ill appearing but not in acute distress. Speaking in full coherent sentences.     Eyes: Eyes are clear, pupils are reactive. No scleral icterus.    HEENT: Oropharynx is clear and moist, no lesions. Normocephalic, no evidence of cranial trauma.      Cardiovascular: Regular rhythm and rate, normal S1 and S2. No murmur, rubs, or gallops. Peripheral pulses intact bilaterally. No lower extremity edema.    Respiratory: Non-labored breathing on room air. Lung sounds are clear to auscultation bilaterally without wheezes, rhonchi, or crackles.    GI: Soft, non-distended. Non-tender, no rebound or guarding. No hepatosplenomegaly or masses appreciated. Normal bowel sounds.     Musculoskeletal: Without obvious deformity, normal range of motion. Normal muscle bulk and tone.  Distal CMS intact.      Skin: Warm and dry, no rashes or ecchymoses. No mottling of skin. Sunburn with blistering present on dorsum aspect of both feet.     Neurologic: Patient moves all extremities. Gross strength and sensation are equal bilaterally.    Genitourinary: Deferred      Medical Decision Making       85 MINUTES SPENT BY ME on the date of service doing chart review, history, exam, documentation & further activities per the note.  MANAGEMENT DISCUSSED with the following over the past 24 hours: Dr. Javier Grajeda   NOTE(S)/MEDICAL RECORDS REVIEWED over the past 24 hours: emergency department notes 7/19/25  Tests ORDERED & REVIEWED in the past 24 hours:  - CMP  - CBC  - BNP  - LFTs  - Lipase  - Magnesium  - Phosphorus  - Troponin  - VBG      Data     I have personally reviewed the following data over the past 24 hrs:    22.8 (H)  \   16.1   / 368     127 (L) 65 (L) 73.1 (H) /  165 (H)   3.0 (L) 44 (H) 3.55 (H) \     ALT: 64 AST: 129 (H) AP: 113 TBILI: 3.7 (H)   ALB: 5.1 TOT PROTEIN: 9.3 (H) LIPASE: 127 (H)     Trop: 44 (H) BNP: 833 (H)     TSH: 0.67 T4: N/A A1C: N/A       Imaging results reviewed over the past 24 hrs:   Recent Results (from the past 24 hours)   XR Chest Port 1 View    Narrative    EXAM: XR CHEST PORT 1 VIEW  LOCATION: [facility]  DATE: 7/19/2025    INDICATION: tachycardia  COMPARISON: 11/8/2024      Impression    IMPRESSION: Negative chest.   CT Abdomen Pelvis w/o Contrast    Narrative    EXAM: CT ABDOMEN PELVIS W/O CONTRAST  LOCATION: [facility]  DATE: 7/19/2025    INDICATION: elevated lipase, abnormal LFTs, BEN  COMPARISON: 11/8/2024  TECHNIQUE: CT scan of the abdomen and pelvis was performed without IV contrast. Multiplanar reformats were obtained. Dose reduction techniques were used.  CONTRAST: None.    FINDINGS:   LOWER CHEST: Normal.    HEPATOBILIARY: Normal.    PANCREAS: Normal noncontrast appearance of the pancreas. No evidence  of acute pancreatitis. No adjacent fluid collections.    SPLEEN: Normal.    ADRENAL GLANDS: Normal.    KIDNEYS/BLADDER: Normal. No ureteral stones or hydronephrosis.    BOWEL: Tiny sliding-type hiatal hernia of doubtful clinical significance. Bowel loops are normal caliber. Normal appendix.    LYMPH NODES: Normal.    VASCULATURE: Normal.    PELVIC ORGANS: Normal.    MUSCULOSKELETAL: Normal.      Impression    IMPRESSION:   1.  No acute pathology identified in abdomen or pelvis.  2.  Normal noncontrast appearance of the pancreas. No evidence of acute pancreatitis.  3.  Tiny sliding-type hiatal hernia of doubtful clinical significance.

## 2025-07-19 NOTE — ED PROVIDER NOTES
History     Chief Complaint   Patient presents with    Alcohol Problem     HPI  Brenden Bishop is a 29 year old male with a past medical history of alcohol abuse, anxiety, cannabis use, amphetamine use, alcohol induced pancreatitis, asthma, bipolar disorder, depression, PTSD who presents to the emergency department with a chief complaint of alcohol withdrawal.  The patient presents to the emergency department accompanied by his father who reports that the patient has been drinking heavily since he was around 14 years old.  Over the last 3 days, the patient has consumed approximately 750 mL of hard liquor daily.  His last drink was last night, when he stopped drinking after developing nausea and vomiting.  Patient has been experiencing withdrawal symptoms this morning, which prompted him to come to the emergency department today.  The patient reports he is very anxious and is having shortness of breath and chest pain.  On arrival, patient was noted to have a rapid heart rate in the 180s.  The patient denies palpitations.    I have reviewed the Medications, Allergies, Past Medical and Surgical History, and Social History in the Epic system.    No past medical history on file.  Past Surgical History:   Procedure Laterality Date    ORTHOPEDIC SURGERY      wrist surg      Current Facility-Administered Medications   Medication Dose Route Frequency Provider Last Rate Last Admin    folic acid (FOLVITE) tablet 1 mg  1 mg Oral Daily Ade Bear MD   1 mg at 07/19/25 1534    LORazepam (ATIVAN) tablet 1-2 mg  1-2 mg Oral Q30 Min PRN Ade Bear MD   1 mg at 07/19/25 1220    melatonin tablet 5 mg  5 mg Oral QPM PRN Ade Bear MD        multivitamin w/minerals (THERA-VIT-M) tablet 1 tablet  1 tablet Oral Daily Ade Bear MD   1 tablet at 07/19/25 1534    ondansetron (ZOFRAN) injection 4 mg  4 mg Intravenous Q30 Min PRN Ade Bear MD   4 mg at 07/19/25 1052    potassium chloride  kristine ER (KLOR-CON M20) CR tablet 40 mEq  40 mEq Oral Once Ade Bear MD        sodium chloride 0.9 % infusion   Intravenous Continuous Ade Bear  mL/hr at 07/19/25 1128 New Bag at 07/19/25 1128    thiamine (B-1) tablet 100 mg  100 mg Oral Daily Ade Bear MD   100 mg at 07/19/25 1534     Current Outpatient Medications   Medication Sig Dispense Refill    baclofen (LIORESAL) 20 MG tablet Take 1 tablet (20 mg) by mouth 3 times daily 90 tablet 3    gabapentin (NEURONTIN) 300 MG capsule Take 1 capsule (300 mg) by mouth 3 times daily 10 capsule 0    naltrexone (DEPADE/REVIA) 50 MG tablet Take 1 tablet (50 mg) by mouth daily for 30 days 30 tablet 0    QUEtiapine (SEROQUEL) 25 MG tablet Take 1 tablet (25 mg) by mouth At Bedtime 90 tablet 3     No Known Allergies  Past medical history, past surgical history, medications, and allergies were reviewed with the patient. Additional pertinent items: None    Social History     Socioeconomic History    Marital status: Single     Spouse name: Not on file    Number of children: Not on file    Years of education: Not on file    Highest education level: Not on file   Occupational History    Not on file   Tobacco Use    Smoking status: Former    Smokeless tobacco: Current   Substance and Sexual Activity    Alcohol use: Yes     Comment: 1 liter daily    Drug use: Yes     Types: Marijuana    Sexual activity: Not on file   Other Topics Concern    Not on file   Social History Narrative    Not on file     Social Drivers of Health     Financial Resource Strain: Not on file   Food Insecurity: No Food Insecurity (1/10/2025)    Received from [employer]    Hunger Vital Sign     Worried About Running Out of Food in the Last Year: Never true     Ran Out of Food in the Last Year: Never true   Transportation Needs: No Transportation Needs (1/10/2025)    Received from [employer]    PRAPARE - Transportation     Lack of Transportation (Medical): No     Lack of  Transportation (Non-Medical): No   Physical Activity: Not on file   Stress: Not on file   Social Connections: Not on file   Interpersonal Safety: Not At Risk (12/8/2024)    Received from [employer]    Humiliation, Afraid, Rape, and Kick questionnaire     Fear of Current or Ex-Partner: No     Emotionally Abused: No     Physically Abused: No     Sexually Abused: No   Housing Stability: High Risk (1/10/2025)    Received from [employer]    Housing Stability Vital Sign     Unable to Pay for Housing in the Last Year: Yes     Number of Times Moved in the Last Year: 3     Homeless in the Last Year: No     Social history was reviewed with the patient. Additional pertinent items: None    Review of Systems  A medically appropriate review of systems was performed with pertinent positives and negatives noted in the HPI, and all other systems negative.    Physical Exam   BP: (!) 126/103  Pulse: (!) 191  Resp: 12  SpO2: 98 %      General: Well nourished, well developed, patient appears anxious, hands are clenched  HEENT: EOMI, anicteric. NCAT, MMM  Neck: no jugular venous distension, supple, nl ROM  Cardiac: Tachycardic rate, irregularly irregular rhythm.  No murmurs, rubs, or gallops. Normal S1, S2.  Intact peripheral pulses  Pulm: CTAB, no stridor, wheezes, rales, rhonchi  Abd: Soft, nontender, nondistended.  No masses palpated.    Skin: Warm and dry to the touch.  No rash  Extremities: No LE edema, no cyanosis, w/w/p  Neuro: Alert, moving all extremities    ED Course        Procedures              EKG Interpretation:      Interpreted by Ade Bear MD  Time reviewed: 1008  Symptoms at time of EKG: Shortness of breath, chest discomfort, tachycardia  Rhythm: Atrial fibrillation with RVR  Rate: Tachycardic, 187 bpm  Axis: NORMAL  Ectopy: none  Conduction: normal  ST Segments/ T Waves: Nonspecific ST abnormality  Q Waves: none  Comparison to prior: Compared to EKG dated 11/14/2022, atrial fibrillation with RVR has  replaced previously seen sinus tachycardia.    Clinical Impression: abnormal EKG         EKG Interpretation:      Interpreted by Ade Bear MD  Time reviewed: 1043  Symptoms at time of EKG: Shortness of breath, anxiety  Rhythm: Atrial fibrillation with RVR  Rate: normal  Axis: NORMAL  Ectopy: none  Conduction: QTcb 497  ST Segments/ T Waves: No ST-T wave changes  Q Waves: none  Comparison to prior: Compared to EKG from 1008 today, rate has decreased    Clinical Impression: abnormal EKG         EKG Interpretation:      Interpreted by Ade Bear MD  Time reviewed: 1240  Symptoms at time of EKG: Shortness of breath  Rhythm: normal sinus rhythm  Rate: normal, 91 bpm  Axis: NORMAL  Ectopy: none  Conduction:QTcb 533  ST Segments/ T Waves: No ST-T wave changes  Q Waves: none  Comparison to prior: Compared to previous EKGs from today, normal sinus rhythm has replaced previously seen atrial fibrillation with RVR    Clinical Impression: abnormal EKG                 Labs Ordered and Resulted from Time of ED Arrival to Time of ED Departure   COMPREHENSIVE METABOLIC PANEL - Abnormal       Result Value    Sodium 130 (*)     Potassium 4.3      Carbon Dioxide (CO2) 40 (*)     Anion Gap        Urea Nitrogen 75.6 (*)     Creatinine 4.62 (*)     GFR Estimate 16 (*)     Calcium 9.5      Chloride <60 (*)     Glucose 151 (*)     Alkaline Phosphatase 113       (*)     ALT 64      Protein Total 9.3 (*)     Albumin 5.1      Bilirubin Total 3.7 (*)    TROPONIN T, HIGH SENSITIVITY - Abnormal    Troponin T, High Sensitivity 58 (*)    NT-PROBNP - Abnormal    NT-proBNP 833 (*)    CBC WITH PLATELETS AND DIFFERENTIAL - Abnormal    WBC Count 30.1 (*)     RBC Count 6.01 (*)     Hemoglobin 18.5 (*)     Hematocrit 50.6      MCV 84      MCH 30.8      MCHC 36.6 (*)     RDW 11.2      Platelet Count 485 (*)     % Neutrophils 87      % Lymphocytes 3      % Monocytes 7      % Eosinophils 0      % Basophils 1      % Immature  Granulocytes 2      NRBCs per 100 WBC 0      Absolute Neutrophils 26.2 (*)     Absolute Lymphocytes 1.0      Absolute Monocytes 2.1 (*)     Absolute Eosinophils 0.0      Absolute Basophils 0.1      Absolute Immature Granulocytes 0.7 (*)     Absolute NRBCs 0.0     GLUCOSE BY METER - Abnormal    GLUCOSE BY METER POCT 182 (*)    PHOSPHORUS - Abnormal    Phosphorus 7.7 (*)    LIPASE - Abnormal    Lipase 127 (*)    TROPONIN T, HIGH SENSITIVITY - Abnormal    Troponin T, High Sensitivity 44 (*)    BLOOD GAS VENOUS - Abnormal    pH Venous 7.58 (*)     pCO2 Venous 60 (*)     pO2 Venous 66 (*)     Bicarbonate Venous 57 (*)     Base Excess/Deficit Venous 28.6 (*)     FIO2 2      Oxyhemoglobin Venous 92 (*)     O2 Sat, Venous 92.9 (*)    BASIC METABOLIC PANEL - Abnormal    Sodium 127 (*)     Potassium 2.6 (*)     Chloride 64 (*)     Carbon Dioxide (CO2) 43 (*)     Anion Gap 20 (*)     Urea Nitrogen 73.1 (*)     Creatinine 3.39 (*)     GFR Estimate 24 (*)     Calcium 8.4 (*)     Glucose 159 (*)    POTASSIUM - Abnormal    Potassium 2.5 (*)    MAGNESIUM - Normal    Magnesium 2.3     TSH WITH FREE T4 REFLEX - Normal    TSH 0.67     ETHANOL LEVEL BLOOD - Normal    Ethanol Level Blood <0.01     RBC AND PLATELET MORPHOLOGY    RBC Morphology Confirmed RBC Indices      Platelet Assessment        Value: Automated Count Confirmed. Platelet morphology is normal.   OSMOLALITY   OSMOLALITY, RANDOM URINE   SODIUM RANDOM URINE   ROUTINE UA WITH MICROSCOPIC REFLEX TO CULTURE       Calls/Consults  Hospitalist: Called (Taras) (07/19/25 1784)    Recent Results (from the past 24 hours)   EKG 12 lead   Result Value Ref Range    Systolic Blood Pressure  mmHg    Diastolic Blood Pressure  mmHg    Ventricular Rate 187 BPM    Atrial Rate  BPM    VT Interval  ms    QRS Duration 76 ms     ms    QTc 430 ms    P Axis  degrees    R AXIS 82 degrees    T Axis 53 degrees    Interpretation ECG       Atrial fibrillation with rapid ventricular  response  Nonspecific ST abnormality  Abnormal ECG  When compared with ECG of 14-Nov-2022 20:38,  Atrial fibrillation has replaced Sinus rhythm  Vent. rate has increased by  74 bpm  Non-specific change in ST segment in Inferior leads  ST now depressed in Anterior leads  T wave inversion no longer evident in Anterior leads     CBC with platelets differential    Narrative    The following orders were created for panel order CBC with platelets differential.  Procedure                               Abnormality         Status                     ---------                               -----------         ------                     CBC with platelets and ...[[id number]]  Abnormal            Final result               RBC and Platelet Morpho...[[id number]]                      Final result                 Please view results for these tests on the individual orders.   Comprehensive metabolic panel   Result Value Ref Range    Sodium 130 (L) 135 - 145 mmol/L    Potassium 4.3 3.4 - 5.3 mmol/L    Carbon Dioxide (CO2) 40 (H) 22 - 29 mmol/L    Anion Gap      Urea Nitrogen 75.6 (H) 6.0 - 20.0 mg/dL    Creatinine 4.62 (H) 0.67 - 1.17 mg/dL    GFR Estimate 16 (L) >60 mL/min/1.73m2    Calcium 9.5 8.8 - 10.4 mg/dL    Chloride <60 (L) 98 - 107 mmol/L    Glucose 151 (H) 70 - 99 mg/dL    Alkaline Phosphatase 113 40 - 150 U/L     (H) 0 - 45 U/L    ALT 64 0 - 70 U/L    Protein Total 9.3 (H) 6.4 - 8.3 g/dL    Albumin 5.1 3.5 - 5.2 g/dL    Bilirubin Total 3.7 (H) <=1.2 mg/dL   Troponin T, High Sensitivity   Result Value Ref Range    Troponin T, High Sensitivity 58 (H) <=22 ng/L   Magnesium   Result Value Ref Range    Magnesium 2.3 1.7 - 2.3 mg/dL   TSH with free T4 reflex   Result Value Ref Range    TSH 0.67 0.30 - 4.20 uIU/mL   Ethanol Level Blood   Result Value Ref Range    Ethanol Level Blood <0.01 <=0.01 g/dL   NT-proBNP   Result Value Ref Range    NT-proBNP 833 (H) 0 - 93 pg/mL   CBC with platelets and differential   Result  Value Ref Range    WBC Count 30.1 (H) 4.0 - 11.0 10e3/uL    RBC Count 6.01 (H) 4.40 - 5.90 10e6/uL    Hemoglobin 18.5 (H) 13.3 - 17.7 g/dL    Hematocrit 50.6 40.0 - 53.0 %    MCV 84 78 - 100 fL    MCH 30.8 26.5 - 33.0 pg    MCHC 36.6 (H) 31.5 - 36.5 g/dL    RDW 11.2 10.0 - 15.0 %    Platelet Count 485 (H) 150 - 450 10e3/uL    % Neutrophils 87 %    % Lymphocytes 3 %    % Monocytes 7 %    % Eosinophils 0 %    % Basophils 1 %    % Immature Granulocytes 2 %    NRBCs per 100 WBC 0 <1 /100    Absolute Neutrophils 26.2 (H) 1.6 - 8.3 10e3/uL    Absolute Lymphocytes 1.0 0.8 - 5.3 10e3/uL    Absolute Monocytes 2.1 (H) 0.0 - 1.3 10e3/uL    Absolute Eosinophils 0.0 0.0 - 0.7 10e3/uL    Absolute Basophils 0.1 0.0 - 0.2 10e3/uL    Absolute Immature Granulocytes 0.7 (H) <=0.4 10e3/uL    Absolute NRBCs 0.0 10e3/uL   RBC and Platelet Morphology   Result Value Ref Range    RBC Morphology Confirmed RBC Indices     Platelet Assessment  Automated Count Confirmed. Platelet morphology is normal.     Automated Count Confirmed. Platelet morphology is normal.   Phosphorus   Result Value Ref Range    Phosphorus 7.7 (H) 2.5 - 4.5 mg/dL   Lipase   Result Value Ref Range    Lipase 127 (H) 13 - 60 U/L   Glucose by meter   Result Value Ref Range    GLUCOSE BY METER POCT 182 (H) 70 - 99 mg/dL   EKG 12 lead   Result Value Ref Range    Systolic Blood Pressure  mmHg    Diastolic Blood Pressure  mmHg    Ventricular Rate 116 BPM    Atrial Rate  BPM    MD Interval  ms    QRS Duration 92 ms     ms    QTc 497 ms    P Axis  degrees    R AXIS 80 degrees    T Axis 59 degrees    Interpretation ECG       Atrial fibrillation with rapid ventricular response  QTcB >= 480 msec  Abnormal ECG  When compared with ECG of 19-Jul-2025 10:08, (unconfirmed)  Vent. rate has decreased by  71 bpm  Non-specific change in ST segment in Inferior leads  ST no longer depressed in Anterior leads     XR Chest Port 1 View    Narrative    EXAM: XR CHEST PORT 1 VIEW  LOCATION: [facility]  DATE: 7/19/2025    INDICATION: tachycardia  COMPARISON: 11/8/2024      Impression    IMPRESSION: Negative chest.   Troponin T, High Sensitivity   Result Value Ref Range    Troponin T, High Sensitivity 44 (H) <=22 ng/L   Blood gas venous   Result Value Ref Range    pH Venous 7.58 (H) 7.32 - 7.43    pCO2 Venous 60 (H) 40 - 50 mm Hg    pO2 Venous 66 (H) 25 - 47 mm Hg    Bicarbonate Venous 57 (HH) 21 - 28 mmol/L    Base Excess/Deficit Venous 28.6 (H) -3.0 - 3.0 mmol/L    FIO2 2     Oxyhemoglobin Venous 92 (H) 70 - 75 %    O2 Sat, Venous 92.9 (H) 70.0 - 75.0 %    Narrative    In healthy individuals, oxyhemoglobin (O2Hb) and oxygen saturation (SO2) are approximately equal. In the presence of dyshemoglobins, oxyhemoglobin can be considerably lower than oxygen saturation.   EKG 12-lead, tracing only   Result Value Ref Range    Systolic Blood Pressure  mmHg    Diastolic Blood Pressure  mmHg    Ventricular Rate 91 BPM    Atrial Rate 91 BPM    AK Interval 140 ms    QRS Duration 98 ms     ms    QTc 533 ms    P Axis 62 degrees    R AXIS 69 degrees    T Axis 62 degrees    Interpretation ECG       Sinus rhythm  Prolonged QT  Abnormal ECG  When compared with ECG of 19-Jul-2025 10:43, (unconfirmed)  Sinus rhythm has replaced Atrial fibrillation     CT Abdomen Pelvis w/o Contrast    Narrative    EXAM: CT ABDOMEN PELVIS W/O CONTRAST  LOCATION: [facility]  DATE: 7/19/2025    INDICATION: elevated lipase, abnormal LFTs, BEN  COMPARISON: 11/8/2024  TECHNIQUE: CT scan of the abdomen and pelvis was performed without IV contrast. Multiplanar reformats were obtained. Dose reduction techniques were used.  CONTRAST: None.    FINDINGS:   LOWER CHEST: Normal.    HEPATOBILIARY: Normal.    PANCREAS: Normal noncontrast appearance of the pancreas. No evidence of acute pancreatitis. No adjacent fluid collections.    SPLEEN: Normal.    ADRENAL GLANDS: Normal.    KIDNEYS/BLADDER:  Normal. No ureteral stones or hydronephrosis.    BOWEL: Tiny sliding-type hiatal hernia of doubtful clinical significance. Bowel loops are normal caliber. Normal appendix.    LYMPH NODES: Normal.    VASCULATURE: Normal.    PELVIC ORGANS: Normal.    MUSCULOSKELETAL: Normal.      Impression    IMPRESSION:   1.  No acute pathology identified in abdomen or pelvis.  2.  Normal noncontrast appearance of the pancreas. No evidence of acute pancreatitis.  3.  Tiny sliding-type hiatal hernia of doubtful clinical significance.       Basic metabolic panel   Result Value Ref Range    Sodium 127 (L) 135 - 145 mmol/L    Potassium 2.6 (LL) 3.4 - 5.3 mmol/L    Chloride 64 (L) 98 - 107 mmol/L    Carbon Dioxide (CO2) 43 (H) 22 - 29 mmol/L    Anion Gap 20 (H) 7 - 15 mmol/L    Urea Nitrogen 73.1 (H) 6.0 - 20.0 mg/dL    Creatinine 3.39 (H) 0.67 - 1.17 mg/dL    GFR Estimate 24 (L) >60 mL/min/1.73m2    Calcium 8.4 (L) 8.8 - 10.4 mg/dL    Glucose 159 (H) 70 - 99 mg/dL   Potassium   Result Value Ref Range    Potassium 2.5 (LL) 3.4 - 5.3 mmol/L       Labs, vital signs, and imaging studies were reviewed by me.    Medications   melatonin tablet 5 mg (has no administration in time range)   LORazepam (ATIVAN) tablet 1-2 mg (1 mg Oral $Given 7/19/25 1220)   multivitamin w/minerals (THERA-VIT-M) tablet 1 tablet (1 tablet Oral $Given 7/19/25 1534)   thiamine (B-1) tablet 100 mg (100 mg Oral $Given 7/19/25 1534)   folic acid (FOLVITE) tablet 1 mg (1 mg Oral $Given 7/19/25 1534)   ondansetron (ZOFRAN) injection 4 mg (4 mg Intravenous $Given 7/19/25 1052)   sodium chloride 0.9 % infusion ( Intravenous $New Bag 7/19/25 1128)   potassium chloride kristine ER (KLOR-CON M20) CR tablet 40 mEq (has no administration in time range)   sodium chloride 0.9% BOLUS 1,000 mL (0 mLs Intravenous Stopped 7/19/25 1119)   diltiazem (CARDIZEM) injection 25 mg (25 mg Intravenous $Given 7/19/25 1020)   OLANZapine (zyPREXA) IV injection 5 mg (5 mg Intravenous $Given 7/19/25  1028)   metoprolol (LOPRESSOR) injection 5 mg (5 mg Intravenous $Given 7/19/25 1033)       Assessments & Plan (with Medical Decision Making)   Brenden Bishop is a 29 year old male who presents to the emergency department with chest pain, shortness of breath, concern for alcohol withdrawal.  Patient noted to be significantly tachycardic on arrival, rhythm appears irregular on the monitor.  Differential diagnosis for this includes atrial fibrillation, SVT, sinus tachycardia. EKG confirmed atrial fibrillation with RVR.  Labs, chest x-ray were ordered to further evaluate the patient for contributing factors to development of arrhythmia such as electrolyte abnormality, infection, ACS, dehydration.  Episode is likely in part triggered by patient's heavy alcohol use/withdrawal.  Cardizem was ordered with only very transient improvement in rate, metoprolol was given, which resulted in significant improvement of patient's heart rate.  IV fluids were also administered.  Additionally, patient's alcohol level 0 on arrival, he was placed on CIWA.  In addition to benzodiazepines administered per CIWA protocol, Zyprexa was also given to help alleviate patient's anxiety.    Repeat EKG shows decreased rate, patient remains in atrial fibrillation    Laboratory workup is remarkable for elevated lipase at 127.  Patient does have a history of alcohol induced pancreatitis, which could have triggered his vomiting last night.  CT of the abdomen pelvis ordered to further evaluate the patient.      Laboratory workup is also remarkable for white blood cell count elevated at 30.1.  This may be due to patient's reported vomiting, chest x-ray, CT of the abdomen and pelvis, and urinalysis are pending to evaluate for underlying cause of infection.  Hemoglobin is elevated to 18.5, likely due to some degree of hemoconcentration.      CMP remarkable for creatinine significantly elevated at 4.62, patient's last creatinine value in April of this year  was normal at 1.05.  Patient's sodium is low at 130, chloride less than 60, bicarb is significantly elevated at 40.  Blood glucose 151, AST is elevated at 129, bilirubin elevated at 3.7, this is likely secondary to patient's heavy alcohol use, CT of the abdomen and pelvis is pending at this time, which should help further evaluate for biliary obstruction, mass, etc.  Magnesium is within normal limits.  TSH is within normal limits.  Blood alcohol level less than 0.01.  Phosphorus is elevated at 7.7.  Urine/serum osmolality, urinalysis, urine sodium levels pending to further evaluate patient's hyponatremia.    VBG shows pH elevated at 7.58, pCO2 elevated at 60, bicarb elevated at 57    Patient's troponin is elevated at 58, likely secondary to significant tachycardia upon arrival, will obtain 2-hour recheck.  BNP is elevated at 833.    Patient noted to convert to sinus rhythm on the monitor, confirmed on repeat EKG    Repeat troponin improved to 44    Critical Care Addendum  My initial assessment, based on my review of vital signs, focused history, physical exam, review of cardiac rhythm monitor, and 12 lead ECG analysis, established a high suspicion that Brenden Bishop has a critical arrhythmia, which requires immediate intervention, and therefore he is critically ill.     After the initial assessment, the care team initiated multiple lab tests, initiated IV fluid administration, initiated medication therapy with Cardizem, metoprolol, and consulted with internal medicine to provide stabilization care. Due to the critical nature of this patient, I reassessed nursing observations, vital signs, physical exam, review of cardiac rhythm monitor, 12 lead ECG analysis, interpretation of labs and imaging, mental status, and respiratory status multiple times prior to his disposition.     Time also spent performing documentation, discussion with family to obtain medical information for decision making, reviewing test results,  discussion with consultants, and coordination of care.     Critical care time (excluding teaching time and procedures): 35 minutes.     Chest x-ray shows clear lungs    Chest x-ray images were personally reviewed by me, I agree with the radiology reads.    CT of the abdomen and pelvis shows no acute pathology, pancreas appears normal, no significant hydronephrosis or biliary obstruction noted    I have reviewed the nursing notes.    I have reviewed the findings, diagnosis, plan and need for follow up with the patient.    Patient was discussed with internal medicine, they recommend repeating BMP to ensure that creatinine is improving, if values appear improved, they will be able to admit the patient here    Repeat BMP shows improving creatinine to 3.39, anion gap is elevated at 20, sodium is low at 127, chloride is low at 64, potassium has dropped to 2.6, replacement was ordered.    Patient and their further management were discussed with internal medicine, to be admitted to their service. Plan was discussed with patient who understands and agrees with plan.    New Prescriptions    No medications on file       Final diagnoses:   Atrial fibrillation with RVR (H)   Hyperphosphatemia   Alcohol withdrawal syndrome, with unspecified complication (H)   Acute kidney injury   Elevated lipase   Elevated brain natriuretic peptide (BNP) level   Elevated troponin   Hypochloremia   Hyperglycemia   Elevated bilirubin   Elevated AST (SGOT)       ANDI BEAR MD  7/19/2025   [facility] EMERGENCY DEPT       Andi Bear MD  07/19/25 [id number]

## 2025-07-19 NOTE — MEDICATION SCRIBE - ADMISSION MEDICATION HISTORY
Medication Scribe Admission Medication History    Admission medication history is complete. The information provided in this note is only as accurate as the sources available at the time of the update.    Information Source(s): Patient via in-person    Pertinent Information: Pt stated that all he takes is Seroquel. Occasionally ibuprofen for pain    Changes made to PTA medication list:  Added:   Ibuprofen 200 mg-2 tablets Q6H PRN  Deleted:   Baclofen 20 mg  Gabapentin 300 mg  Naltrexone 50 mg  Changed: None    Allergies reviewed with patient and updates made in EHR: yes    Medication History Completed By: Mo Shah 7/19/2025 5:07 PM    PTA Med List   Medication Sig Last Dose/Taking    ibuprofen (ADVIL/MOTRIN) 200 MG tablet Take 400 mg by mouth every 6 hours as needed for moderate pain. Past Month    QUEtiapine (SEROQUEL) 25 MG tablet Take 1 tablet (25 mg) by mouth At Bedtime Past Week

## 2025-07-19 NOTE — LETTER
Transition Communication Hand-off for Care Transitions to Next Level of Care Provider    Name: Brenden Bishop  : 1995  MRN #: [id number]  Primary Care Provider: [facility]     Primary Clinic: [address] [place] [id number]     Reason for Hospitalization:  Hypochloremia [E87.8]  Hyperphosphatemia [E83.39]  Hyperglycemia [R73.9]  Elevated troponin [R79.89]  Atrial fibrillation with RVR (H) [I48.91]  Acute kidney injury [N17.9]  Elevated brain natriuretic peptide (BNP) level [R79.89]  Elevated bilirubin [R17]  Elevated lipase [R74.8]  Elevated AST (SGOT) [R74.01]  Alcohol withdrawal syndrome, with unspecified complication (H) [F10.939]  Admit Date/Time: 2025  9:56 AM  Discharge Date: 1-2 days  Payor Source: Payor: BCBS / Plan: [place]BS [place] / Product Type: Indemnity /     Reason for Communication Hand-off Referral: High risk for readmission    Discharge Plan:  Home       Concern for non-adherence with plan of care:   Y/N no  Discharge Needs Assessment:  Needs      Flowsheet Row Most Recent Value   Equipment Currently Used at Home none          Follow-up plan:  No future appointments.                    Trudy Peterson RN    AVS/Discharge Summary is the source of truth; this is a helpful guide for improved communication of patient story

## 2025-07-20 LAB
ALBUMIN SERPL BCG-MCNC: 3.7 G/DL (ref 3.5–5.2)
ALP SERPL-CCNC: 75 U/L (ref 40–150)
ALT SERPL W P-5'-P-CCNC: 44 U/L (ref 0–70)
ANION GAP SERPL CALCULATED.3IONS-SCNC: 11 MMOL/L (ref 7–15)
ANION GAP SERPL CALCULATED.3IONS-SCNC: 12 MMOL/L (ref 7–15)
ANION GAP SERPL CALCULATED.3IONS-SCNC: 14 MMOL/L (ref 7–15)
AST SERPL W P-5'-P-CCNC: 77 U/L (ref 0–45)
ATRIAL RATE - MUSE: 91 BPM
ATRIAL RATE - MUSE: NORMAL BPM
ATRIAL RATE - MUSE: NORMAL BPM
BASE EXCESS BLDV CALC-SCNC: 17.3 MMOL/L (ref -3–3)
BASE EXCESS BLDV CALC-SCNC: 22.8 MMOL/L (ref -3–3)
BASOPHILS # BLD AUTO: 0 10E3/UL (ref 0–0.2)
BASOPHILS NFR BLD AUTO: 0 %
BILIRUB SERPL-MCNC: 2.7 MG/DL
BUN SERPL-MCNC: 49.2 MG/DL (ref 6–20)
BUN SERPL-MCNC: 60.9 MG/DL (ref 6–20)
BUN SERPL-MCNC: 68.3 MG/DL (ref 6–20)
CALCIUM SERPL-MCNC: 8 MG/DL (ref 8.8–10.4)
CALCIUM SERPL-MCNC: 8.5 MG/DL (ref 8.8–10.4)
CALCIUM SERPL-MCNC: 8.8 MG/DL (ref 8.8–10.4)
CHLORIDE SERPL-SCNC: 75 MMOL/L (ref 98–107)
CHLORIDE SERPL-SCNC: 77 MMOL/L (ref 98–107)
CHLORIDE SERPL-SCNC: 87 MMOL/L (ref 98–107)
CK SERPL-CCNC: 2287 U/L (ref 39–308)
CREAT SERPL-MCNC: 2.73 MG/DL (ref 0.67–1.17)
CREAT SERPL-MCNC: 2.94 MG/DL (ref 0.67–1.17)
CREAT SERPL-MCNC: 3.12 MG/DL (ref 0.67–1.17)
DIASTOLIC BLOOD PRESSURE - MUSE: NORMAL MMHG
EGFRCR SERPLBLD CKD-EPI 2021: 26 ML/MIN/1.73M2
EGFRCR SERPLBLD CKD-EPI 2021: 28 ML/MIN/1.73M2
EGFRCR SERPLBLD CKD-EPI 2021: 31 ML/MIN/1.73M2
EOSINOPHIL # BLD AUTO: 0 10E3/UL (ref 0–0.7)
EOSINOPHIL NFR BLD AUTO: 0 %
ERYTHROCYTE [DISTWIDTH] IN BLOOD BY AUTOMATED COUNT: 11.3 % (ref 10–15)
ERYTHROCYTE [DISTWIDTH] IN BLOOD BY AUTOMATED COUNT: 11.5 % (ref 10–15)
GLUCOSE SERPL-MCNC: 107 MG/DL (ref 70–99)
GLUCOSE SERPL-MCNC: 138 MG/DL (ref 70–99)
GLUCOSE SERPL-MCNC: 139 MG/DL (ref 70–99)
HCO3 BLDV-SCNC: 42 MMOL/L (ref 21–28)
HCO3 BLDV-SCNC: 49 MMOL/L (ref 21–28)
HCO3 SERPL-SCNC: 33 MMOL/L (ref 22–29)
HCO3 SERPL-SCNC: 39 MMOL/L (ref 22–29)
HCO3 SERPL-SCNC: 39 MMOL/L (ref 22–29)
HCT VFR BLD AUTO: 39.1 % (ref 40–53)
HCT VFR BLD AUTO: 39.8 % (ref 40–53)
HGB BLD-MCNC: 13.9 G/DL (ref 13.3–17.7)
HGB BLD-MCNC: 14.3 G/DL (ref 13.3–17.7)
HOLD SPECIMEN: NORMAL
IMM GRANULOCYTES # BLD: 0.1 10E3/UL
IMM GRANULOCYTES NFR BLD: 1 %
INTERPRETATION ECG - MUSE: NORMAL
LACTATE SERPL-SCNC: 1.4 MMOL/L (ref 0.7–2)
LYMPHOCYTES # BLD AUTO: 1 10E3/UL (ref 0.8–5.3)
LYMPHOCYTES NFR BLD AUTO: 6 %
MAGNESIUM SERPL-MCNC: 2.8 MG/DL (ref 1.7–2.3)
MCH RBC QN AUTO: 31.2 PG (ref 26.5–33)
MCH RBC QN AUTO: 31.6 PG (ref 26.5–33)
MCHC RBC AUTO-ENTMCNC: 35.5 G/DL (ref 31.5–36.5)
MCHC RBC AUTO-ENTMCNC: 35.9 G/DL (ref 31.5–36.5)
MCV RBC AUTO: 88 FL (ref 78–100)
MCV RBC AUTO: 88 FL (ref 78–100)
MONOCYTES # BLD AUTO: 0.9 10E3/UL (ref 0–1.3)
MONOCYTES NFR BLD AUTO: 5 %
NEUTROPHILS # BLD AUTO: 16.2 10E3/UL (ref 1.6–8.3)
NEUTROPHILS NFR BLD AUTO: 89 %
NRBC # BLD AUTO: 0 10E3/UL
NRBC BLD AUTO-RTO: 0 /100
O2/TOTAL GAS SETTING VFR VENT: 21 %
O2/TOTAL GAS SETTING VFR VENT: 21 %
OXYHGB MFR BLDV: 86 % (ref 70–75)
OXYHGB MFR BLDV: 88 % (ref 70–75)
P AXIS - MUSE: 62 DEGREES
P AXIS - MUSE: NORMAL DEGREES
P AXIS - MUSE: NORMAL DEGREES
PCO2 BLDV: 46 MM HG (ref 40–50)
PCO2 BLDV: 57 MM HG (ref 40–50)
PH BLDV: 7.54 [PH] (ref 7.32–7.43)
PH BLDV: 7.56 [PH] (ref 7.32–7.43)
PHOSPHATE SERPL-MCNC: 3.8 MG/DL (ref 2.5–4.5)
PLATELET # BLD AUTO: 316 10E3/UL (ref 150–450)
PLATELET # BLD AUTO: 321 10E3/UL (ref 150–450)
PO2 BLDV: 51 MM HG (ref 25–47)
PO2 BLDV: 58 MM HG (ref 25–47)
POTASSIUM SERPL-SCNC: 3.2 MMOL/L (ref 3.4–5.3)
POTASSIUM SERPL-SCNC: 3.4 MMOL/L (ref 3.4–5.3)
POTASSIUM SERPL-SCNC: 3.4 MMOL/L (ref 3.4–5.3)
POTASSIUM SERPL-SCNC: 3.5 MMOL/L (ref 3.4–5.3)
PR INTERVAL - MUSE: 140 MS
PR INTERVAL - MUSE: NORMAL MS
PR INTERVAL - MUSE: NORMAL MS
PROT SERPL-MCNC: 6.7 G/DL (ref 6.4–8.3)
QRS DURATION - MUSE: 76 MS
QRS DURATION - MUSE: 92 MS
QRS DURATION - MUSE: 98 MS
QT - MUSE: 244 MS
QT - MUSE: 358 MS
QT - MUSE: 434 MS
QTC - MUSE: 430 MS
QTC - MUSE: 497 MS
QTC - MUSE: 533 MS
R AXIS - MUSE: 69 DEGREES
R AXIS - MUSE: 80 DEGREES
R AXIS - MUSE: 82 DEGREES
RBC # BLD AUTO: 4.45 10E6/UL (ref 4.4–5.9)
RBC # BLD AUTO: 4.52 10E6/UL (ref 4.4–5.9)
SAO2 % BLDV: 88.3 % (ref 70–75)
SAO2 % BLDV: 90.3 % (ref 70–75)
SODIUM SERPL-SCNC: 126 MMOL/L (ref 135–145)
SODIUM SERPL-SCNC: 130 MMOL/L (ref 135–145)
SODIUM SERPL-SCNC: 131 MMOL/L (ref 135–145)
SYSTOLIC BLOOD PRESSURE - MUSE: NORMAL MMHG
T AXIS - MUSE: 53 DEGREES
T AXIS - MUSE: 59 DEGREES
T AXIS - MUSE: 62 DEGREES
VENTRICULAR RATE- MUSE: 116 BPM
VENTRICULAR RATE- MUSE: 187 BPM
VENTRICULAR RATE- MUSE: 91 BPM
WBC # BLD AUTO: 18.2 10E3/UL (ref 4–11)
WBC # BLD AUTO: 19.8 10E3/UL (ref 4–11)

## 2025-07-20 PROCEDURE — 250N000009 HC RX 250: Performed by: PHYSICIAN ASSISTANT

## 2025-07-20 PROCEDURE — 99232 SBSQ HOSP IP/OBS MODERATE 35: CPT | Performed by: STUDENT IN AN ORGANIZED HEALTH CARE EDUCATION/TRAINING PROGRAM

## 2025-07-20 PROCEDURE — 85027 COMPLETE CBC AUTOMATED: CPT | Performed by: PHYSICIAN ASSISTANT

## 2025-07-20 PROCEDURE — 83605 ASSAY OF LACTIC ACID: CPT | Performed by: PHYSICIAN ASSISTANT

## 2025-07-20 PROCEDURE — 84132 ASSAY OF SERUM POTASSIUM: CPT | Performed by: STUDENT IN AN ORGANIZED HEALTH CARE EDUCATION/TRAINING PROGRAM

## 2025-07-20 PROCEDURE — 80048 BASIC METABOLIC PNL TOTAL CA: CPT | Performed by: PHYSICIAN ASSISTANT

## 2025-07-20 PROCEDURE — 83735 ASSAY OF MAGNESIUM: CPT | Performed by: PHYSICIAN ASSISTANT

## 2025-07-20 PROCEDURE — 250N000013 HC RX MED GY IP 250 OP 250 PS 637: Performed by: PHYSICIAN ASSISTANT

## 2025-07-20 PROCEDURE — 250N000013 HC RX MED GY IP 250 OP 250 PS 637: Performed by: INTERNAL MEDICINE

## 2025-07-20 PROCEDURE — 120N000013 HC R&B IMCU

## 2025-07-20 PROCEDURE — 84100 ASSAY OF PHOSPHORUS: CPT | Performed by: PHYSICIAN ASSISTANT

## 2025-07-20 PROCEDURE — 85014 HEMATOCRIT: CPT | Performed by: PHYSICIAN ASSISTANT

## 2025-07-20 PROCEDURE — 82040 ASSAY OF SERUM ALBUMIN: CPT | Performed by: STUDENT IN AN ORGANIZED HEALTH CARE EDUCATION/TRAINING PROGRAM

## 2025-07-20 PROCEDURE — 250N000009 HC RX 250: Performed by: STUDENT IN AN ORGANIZED HEALTH CARE EDUCATION/TRAINING PROGRAM

## 2025-07-20 PROCEDURE — 36415 COLL VENOUS BLD VENIPUNCTURE: CPT | Performed by: STUDENT IN AN ORGANIZED HEALTH CARE EDUCATION/TRAINING PROGRAM

## 2025-07-20 PROCEDURE — 258N000003 HC RX IP 258 OP 636: Performed by: PHYSICIAN ASSISTANT

## 2025-07-20 PROCEDURE — 250N000013 HC RX MED GY IP 250 OP 250 PS 637: Performed by: STUDENT IN AN ORGANIZED HEALTH CARE EDUCATION/TRAINING PROGRAM

## 2025-07-20 PROCEDURE — 82550 ASSAY OF CK (CPK): CPT | Performed by: STUDENT IN AN ORGANIZED HEALTH CARE EDUCATION/TRAINING PROGRAM

## 2025-07-20 PROCEDURE — 36415 COLL VENOUS BLD VENIPUNCTURE: CPT | Performed by: PHYSICIAN ASSISTANT

## 2025-07-20 PROCEDURE — 82805 BLOOD GASES W/O2 SATURATION: CPT | Performed by: STUDENT IN AN ORGANIZED HEALTH CARE EDUCATION/TRAINING PROGRAM

## 2025-07-20 PROCEDURE — 82310 ASSAY OF CALCIUM: CPT | Performed by: STUDENT IN AN ORGANIZED HEALTH CARE EDUCATION/TRAINING PROGRAM

## 2025-07-20 PROCEDURE — 250N000011 HC RX IP 250 OP 636: Performed by: PHYSICIAN ASSISTANT

## 2025-07-20 PROCEDURE — 82805 BLOOD GASES W/O2 SATURATION: CPT | Performed by: PHYSICIAN ASSISTANT

## 2025-07-20 RX ORDER — POTASSIUM CHLORIDE 1500 MG/1
40 TABLET, EXTENDED RELEASE ORAL ONCE
Status: COMPLETED | OUTPATIENT
Start: 2025-07-20 | End: 2025-07-20

## 2025-07-20 RX ORDER — LIDOCAINE 4 G/G
1 PATCH TOPICAL DAILY PRN
Status: DISCONTINUED | OUTPATIENT
Start: 2025-07-20 | End: 2025-07-22 | Stop reason: HOSPADM

## 2025-07-20 RX ORDER — ACETAMINOPHEN 325 MG/1
650 TABLET ORAL EVERY 4 HOURS PRN
Status: DISCONTINUED | OUTPATIENT
Start: 2025-07-20 | End: 2025-07-22 | Stop reason: HOSPADM

## 2025-07-20 RX ORDER — GABAPENTIN 300 MG/1
300 CAPSULE ORAL EVERY 8 HOURS
Status: DISCONTINUED | OUTPATIENT
Start: 2025-07-20 | End: 2025-07-22 | Stop reason: HOSPADM

## 2025-07-20 RX ORDER — GABAPENTIN 100 MG/1
100 CAPSULE ORAL EVERY 8 HOURS
Status: DISCONTINUED | OUTPATIENT
Start: 2025-07-23 | End: 2025-07-22 | Stop reason: HOSPADM

## 2025-07-20 RX ORDER — LIDOCAINE HYDROCHLORIDE 20 MG/ML
5 SOLUTION OROPHARYNGEAL
Status: DISCONTINUED | OUTPATIENT
Start: 2025-07-20 | End: 2025-07-22 | Stop reason: HOSPADM

## 2025-07-20 RX ADMIN — PANTOPRAZOLE SODIUM 40 MG: 40 INJECTION, POWDER, LYOPHILIZED, FOR SOLUTION INTRAVENOUS at 20:17

## 2025-07-20 RX ADMIN — THIAMINE HYDROCHLORIDE 200 MG: 100 INJECTION, SOLUTION INTRAMUSCULAR; INTRAVENOUS at 20:17

## 2025-07-20 RX ADMIN — GABAPENTIN 300 MG: 300 CAPSULE ORAL at 21:26

## 2025-07-20 RX ADMIN — ONDANSETRON 4 MG: 2 INJECTION, SOLUTION INTRAMUSCULAR; INTRAVENOUS at 20:18

## 2025-07-20 RX ADMIN — FOLIC ACID 1 MG: 5 INJECTION, SOLUTION INTRAMUSCULAR; INTRAVENOUS; SUBCUTANEOUS at 08:37

## 2025-07-20 RX ADMIN — Medication 1 TABLET: at 08:37

## 2025-07-20 RX ADMIN — POTASSIUM CHLORIDE 40 MEQ: 1500 TABLET, EXTENDED RELEASE ORAL at 13:00

## 2025-07-20 RX ADMIN — THIAMINE HYDROCHLORIDE 200 MG: 100 INJECTION, SOLUTION INTRAMUSCULAR; INTRAVENOUS at 14:26

## 2025-07-20 RX ADMIN — LIDOCAINE HYDROCHLORIDE 5 ML: 20 SOLUTION ORAL at 12:45

## 2025-07-20 RX ADMIN — LIDOCAINE 4% 1 PATCH: 40 PATCH TOPICAL at 21:26

## 2025-07-20 RX ADMIN — THIAMINE HYDROCHLORIDE 200 MG: 100 INJECTION, SOLUTION INTRAMUSCULAR; INTRAVENOUS at 08:36

## 2025-07-20 RX ADMIN — SODIUM CHLORIDE, POTASSIUM CHLORIDE, SODIUM LACTATE AND CALCIUM CHLORIDE: 600; 310; 30; 20 INJECTION, SOLUTION INTRAVENOUS at 16:40

## 2025-07-20 RX ADMIN — SODIUM CHLORIDE, POTASSIUM CHLORIDE, SODIUM LACTATE AND CALCIUM CHLORIDE: 600; 310; 30; 20 INJECTION, SOLUTION INTRAVENOUS at 03:54

## 2025-07-20 RX ADMIN — QUETIAPINE FUMARATE 25 MG: 25 TABLET ORAL at 20:17

## 2025-07-20 RX ADMIN — POTASSIUM CHLORIDE 40 MEQ: 1500 TABLET, EXTENDED RELEASE ORAL at 06:10

## 2025-07-20 RX ADMIN — POTASSIUM CHLORIDE 40 MEQ: 1500 TABLET, EXTENDED RELEASE ORAL at 20:17

## 2025-07-20 RX ADMIN — ONDANSETRON 4 MG: 2 INJECTION, SOLUTION INTRAMUSCULAR; INTRAVENOUS at 12:44

## 2025-07-20 RX ADMIN — SODIUM CHLORIDE, POTASSIUM CHLORIDE, SODIUM LACTATE AND CALCIUM CHLORIDE: 600; 310; 30; 20 INJECTION, SOLUTION INTRAVENOUS at 09:45

## 2025-07-20 RX ADMIN — LIDOCAINE HYDROCHLORIDE 5 ML: 20 SOLUTION ORAL at 16:41

## 2025-07-20 RX ADMIN — METOPROLOL TARTRATE 5 MG: 5 INJECTION INTRAVENOUS at 14:32

## 2025-07-20 RX ADMIN — PANTOPRAZOLE SODIUM 40 MG: 40 INJECTION, POWDER, LYOPHILIZED, FOR SOLUTION INTRAVENOUS at 08:37

## 2025-07-20 ASSESSMENT — ACTIVITIES OF DAILY LIVING (ADL)
ADLS_ACUITY_SCORE: 28
ADLS_ACUITY_SCORE: 19
ADLS_ACUITY_SCORE: 28
ADLS_ACUITY_SCORE: 19
ADLS_ACUITY_SCORE: 28
ADLS_ACUITY_SCORE: 19
ADLS_ACUITY_SCORE: 28
ADLS_ACUITY_SCORE: 19
ADLS_ACUITY_SCORE: 19
ADLS_ACUITY_SCORE: 28
ADLS_ACUITY_SCORE: 19
ADLS_ACUITY_SCORE: 19
ADLS_ACUITY_SCORE: 28
ADLS_ACUITY_SCORE: 19
ADLS_ACUITY_SCORE: 19

## 2025-07-20 NOTE — PROGRESS NOTES
[facility] ADMISSION NOTE    Patient admitted to room ICU 2 at approximately 17:05 via cart from emergency room. Patient was accompanied by nurse.     Verbal SBAR report received from Karine JOHANSEN prior to patient arrival.     Patient ambulated to bed independently. Patient alert and oriented X 3. The patient is not having any pain.  . Admission vital signs: Blood pressure (!) 147/104, pulse 93, temperature 98.2  F (36.8  C), temperature source Oral, resp. rate 21, SpO2 (!) 89%. Patient was oriented to plan of care, call light, bed controls, tv, telephone, bathroom, and visiting hours.     Risk Assessment    The following safety risks were identified during admission: fall and skin. Yellow risk band applied: YES.     Skin Initial Assessment    This writer admitted this patient and completed a full skin assessment and Basil score in the Adult PCS flowsheet.   Photo documentation of skin problem and/or wound competed via [employer] application (located under Media):  Yes    Appropriate interventions initiated as needed.     Sunburn all over body.   Skin peeling on top of bilat feet d/t sunburn.     Secondary skin assessment completed by Jason JOHANSEN .    Basil Risk Assessment  Sensory Perception: 4-->no impairment  Moisture: 4-->rarely moist  Activity: 4-->walks frequently  Mobility: 3-->slightly limited  Nutrition: 3-->adequate  Friction and Shear: 3-->no apparent problem  Basil Score: 21  Mattress: Low air loss (RAY pump, Isolibrium, Skin Guard, Pulsate, Isoair, etc.)  Bed Frame: Standard width and length    Education    Patient has a [place] to Observation order: No  Observation education completed and documented: N/A      Meredith Sheets RN

## 2025-07-20 NOTE — CONSULTS
Care Management:      Received Consult due to high risk for readmission.  Chart reviewed and plan of care discussed in Interdisciplinary Rounds.  There are no discharge needs anticipated.  Care Management will continue to monitor through Interdisciplinary Rounds and intervene if needed.      External hand off referral placed for [facility] Care Coordination.    Care Management signed off.       Trudy Peterson RN, Care Coordinator

## 2025-07-20 NOTE — PROGRESS NOTES
[facility]    Medicine Progress Note - Hospitalist Service    Date of Admission:  7/19/2025    Assessment & Plan      Brenden Bishop is a 29 year old male admitted on 7/19/2025. He has a past medical history significant for alcohol use disorder, bipolar 1, ADHD, anxiety, and depression who presented to the emergency department for evaluation of intractable nausea and vomiting in the setting of recent alcohol binge and was found to have an acute kidney injury, metabolic alkalosis and respiratory acidosis, A-fib RVR, and multiple other metabolic derangements.    Acute kidney injury secondary to rhabdomyolysis   Admit creatinine 4.62 (baseline 0.9 - 1.1), GFR 16 (baseline > 60). Occurs in the setting of severe nausea and vomiting as well as alcohol binge, suspect prerenal due to severe dehydration. Is making urine, renal function gradually improving after initiation of IV fluids. No acute indication for dialysis at this time. UA with large blood and no RBC's so possibly concerning for rhabdomyolysis, which came back positive CK 2,287. Cr improving 3.55 ? 3.12 ? 2.94 with IVF. Denies any crush injuries or prolonged period of being down. Possibly related    - Continue LR @ 150 ml/hr  - BMP q12h     Alcohol use disorder  Alcohol withdrawal syndrome  Not a daily drinker, but does binge drink heavily. Last period of sobriety lasted about 3 months, then started drinking about 3 days ago. Has been drinking a fifth of vodka daily x 3 days, last drink evening of 7/18, the developed intractable vomiting and unable to drink more. Started getting tremulous and anxious morning 7/19, no seizures. Started on CIWA in the emergency department.   - Monitor on CIWA with Valium available  - Gabapentin taper  - No clonidine ordered  - MVI, thiamine, folic acid    Metabolic alkalosis with respiratory acidosis  Admission VBG appears to be mixed metabolic alkalosis with compensatory respiratory acidosis - pH 7.58  "/ pCO2 60 / bicarb 57. No wheezes on exam to suggest COPD / asthma exacerbation driving the pCO2 elevation. High bicarb possibly due to GI losses of HCl secondary to vomiting given degree of elevation.   - IVF as above   - Repeat VBG q12h    Hypokalemia   Hypermagnesemia   Hyperphosphatemia  Hypochloremia  Hyponatremia  Admission K WNL (4.3), but then dropped to 2.6.   Mg WNL (2.3) ?  2.8 (high)  Phos high (7.7) ? 3.8 (wnl)  Chloride <60 ?  64 ?  65 (low) ? 77  Sodium 126 ? 130  Multiple electrolyte abnormalities likely due to some losses from nausea / vomiting and poor oral intake.   - Repeat electrolytes again in am     Leukocytosis   Thrombocytosis   Admission WBC 30.1, hemoglobin 18.5, platelets 485. UA and chest x-ray not suggestive of infection.     CT abdomen & pelvis - \"1.  No acute pathology identified in abdomen or pelvis.  2.  Normal noncontrast appearance of the pancreas. No evidence of acute pancreatitis.  3.  Tiny sliding-type hiatal hernia of doubtful clinical significance.\"    Likely hemo concentrated in the setting of severe dehydration, which appears to be improving with IVF and resolution of BEN/electrolyte abnormalities. 30.1 ? 18.2.  - CBC am   - Observe off antibiotics, monitor for fever    Atrial fibrillation with RVR, resolved  Elevated troponin  Elevated brain natriuretic peptide (BNP) level  Presented with heart rate as high as 190, EKG with new atrial fibrillation RVR. Was given diltiazem 25 mg and metoprolol 25 mg as well as IV fluids, patient spontaneously converted to NSR. Atrial fibrillation likely provoked by profound dehydration, alcohol, and electrolyte abnormalities.   - Monitor on telemetry  - No anticoagulation at this time    Intractable nausea and vomiting, suspect alcoholic gastritis  Chemical pharyngitis   Acute onset of intractable nausea and vomiting in the setting of alcohol binge. Non-bloody emesis. Likely has alcoholic gastritis and pharyngitis secondary to vomiting.   - " Chloraseptic spray and viscous lidocaine for PRN relief  - IV Protonix    Bipolar 1 disorder  Major depressive disorder, recurrent, in partial remission  Panic attacks  Managed prior to admission with Seroquel 25 mg qhs, continue.     Hypochloremia  Likely due to vomiting.  - Monitor and correct with IV fluids.          Diet: Advance Diet as Tolerated: Clear Liquid Diet    DVT Prophylaxis: Pneumatic Compression Devices  Mancilla Catheter: Not present  Lines: None     Cardiac Monitoring: ACTIVE order. Indication: Electrolyte Imbalance (24 hours)- Magnesium <1.3 mg/ml; Potassium < =2.8 or > 5.5 mg/ml  Code Status: Full Code      Clinically Significant Risk Factors Present on Admission        # Hypokalemia: Lowest K = 2.5 mmol/L in last 2 days, will replace as needed  # Hyponatremia: Lowest Na = 126 mmol/L in last 2 days, will monitor as appropriate  # Hypochloremia: Lowest Cl = 54 mmol/L in last 2 days, will monitor as appropriate  # Hypocalcemia: Lowest Ca = 8 mg/dL in last 2 days, will monitor and replace as appropriate    # Anion Gap Metabolic Acidosis: Highest Anion Gap = 20 mmol/L in last 2 days, will monitor and treat as appropriate                    # Asthma: noted on problem list        Social Drivers of Health    Tobacco Use: Medium Risk (5/19/2025)    Received from [facility]    Patient History     Smoking Tobacco Use: Former     Smokeless Tobacco Use: Former          Disposition Plan     Medically Ready for Discharge: Anticipated Tomorrow             Gerson James DO  Hospitalist Service  [facility]  Securely message with [employer] (more info)  Text page via [employer] Paging/Directory   ______________________________________________________________________    Interval History   No acute events overnight. Renal function and vomiting improving since admission. Some nausea persists, and throat pain is present due to vomiting. No episodes of vomiting since being in the hospital, except for  a tiny bit yesterday. Engaged in a binge drinking episode 3 to 4 days ago, which got out of control. Not an everyday drinker, but this episode was triggered by boredom. Recently discharged from [facility] 3.5 months ago, indicating a history of alcohol-related issues. No history of atrial fibrillation. No swelling in the legs. No issues with breathing. No chest pain or blood in vomit.     Physical Exam   Vital Signs: Temp: 98.3  F (36.8  C) Temp src: Oral BP: 137/83 Pulse: 88   Resp: 15 SpO2: 93 % O2 Device: None (Room air)    Weight: 166 lbs 7.16 oz    Constitutional: awake, alert, cooperative, no apparent distress, and appears stated age  Respiratory: No increased work of breathing, good air exchange, clear to auscultation bilaterally, no crackles or wheezing  Cardiovascular: Normal apical impulse, regular rate and rhythm, normal S1 and S2, no S3 or S4, and no murmur noted  GI: No scars, normal bowel sounds, soft, non-distended, non-tender, no masses palpated, no hepatosplenomegally  Skin: normal skin color, texture, turgor  Musculoskeletal: There is no redness, warmth, or swelling of the joints.  Full range of motion noted.  Motor strength is 5 out of 5 all extremities bilaterally.  Tone is normal.    Medical Decision Making       45 MINUTES SPENT BY ME on the date of service doing chart review, history, exam, documentation & further activities per the note.      Data     I have personally reviewed the following data over the past 24 hrs:    18.2 (H)  \   13.9   / 321     130 (L) 77 (L) 60.9 (H) /  138 (H)   3.2 (L) 39 (H) 2.94 (H) \     ALT: 44 AST: 77 (H) AP: 75 TBILI: 2.7 (H)   ALB: 3.7 TOT PROTEIN: 6.7 LIPASE: 127 (H)     Trop: 44 (H) BNP: 833 (H)     TSH: 0.67 T4: N/A A1C: N/A     Procal: N/A CRP: N/A Lactic Acid: 1.4         Imaging results reviewed over the past 24 hrs:   Recent Results (from the past 24 hours)   XR Chest Port 1 View    Narrative    EXAM: XR CHEST PORT 1 VIEW  LOCATION: [facility]  DATE: 7/19/2025    INDICATION: tachycardia  COMPARISON: 11/8/2024      Impression    IMPRESSION: Negative chest.   CT Abdomen Pelvis w/o Contrast    Narrative    EXAM: CT ABDOMEN PELVIS W/O CONTRAST  LOCATION: [facility]  DATE: 7/19/2025    INDICATION: elevated lipase, abnormal LFTs, BEN  COMPARISON: 11/8/2024  TECHNIQUE: CT scan of the abdomen and pelvis was performed without IV contrast. Multiplanar reformats were obtained. Dose reduction techniques were used.  CONTRAST: None.    FINDINGS:   LOWER CHEST: Normal.    HEPATOBILIARY: Normal.    PANCREAS: Normal noncontrast appearance of the pancreas. No evidence of acute pancreatitis. No adjacent fluid collections.    SPLEEN: Normal.    ADRENAL GLANDS: Normal.    KIDNEYS/BLADDER: Normal. No ureteral stones or hydronephrosis.    BOWEL: Tiny sliding-type hiatal hernia of doubtful clinical significance. Bowel loops are normal caliber. Normal appendix.    LYMPH NODES: Normal.    VASCULATURE: Normal.    PELVIC ORGANS: Normal.    MUSCULOSKELETAL: Normal.      Impression    IMPRESSION:   1.  No acute pathology identified in abdomen or pelvis.  2.  Normal noncontrast appearance of the pancreas. No evidence of acute pancreatitis.  3.  Tiny sliding-type hiatal hernia of doubtful clinical significance.

## 2025-07-20 NOTE — PROGRESS NOTES
End of Shift Note    Situation: Patient here w/ BEN.     Plan: Continue rehydration. Monitor CIWA scores. Replace electrolytes as needed.     Subjective/Objective    Neuro:A&OX4 - Scoring 3-7 on CIWA throughout the night. Denies hallucinations. No PRN coverage needed per order.     Cardiac:SR. Can get a bit tachycardic with activity. Gave 2L LR bolus yesterday evening.     Resp:WDL. Currently saturating >90 on RA. Lung fields clear.     GI/: Intermittently nauseous. Had x1 episode of emesis yesterday evening. Undigested food. Reports relief with use of compazine.     MSK:SBA.    Skin: Sunburned. Skin flaking on bilateral foot d/t sunburn.     LDA:2X PIV.

## 2025-07-21 LAB
ANION GAP SERPL CALCULATED.3IONS-SCNC: 13 MMOL/L (ref 7–15)
ANION GAP SERPL CALCULATED.3IONS-SCNC: 9 MMOL/L (ref 7–15)
BASE EXCESS BLDV CALC-SCNC: 12.3 MMOL/L (ref -3–3)
BASE EXCESS BLDV CALC-SCNC: 9.6 MMOL/L (ref -3–3)
BUN SERPL-MCNC: 43.2 MG/DL (ref 6–20)
BUN SERPL-MCNC: 47.9 MG/DL (ref 6–20)
CALCIUM SERPL-MCNC: 8.2 MG/DL (ref 8.8–10.4)
CALCIUM SERPL-MCNC: 8.6 MG/DL (ref 8.8–10.4)
CHLORIDE SERPL-SCNC: 93 MMOL/L (ref 98–107)
CHLORIDE SERPL-SCNC: 97 MMOL/L (ref 98–107)
CK SERPL-CCNC: 1102 U/L (ref 39–308)
CREAT SERPL-MCNC: 2.8 MG/DL (ref 0.67–1.17)
CREAT SERPL-MCNC: 2.94 MG/DL (ref 0.67–1.17)
CREAT UR-MCNC: 74.1 MG/DL
EGFRCR SERPLBLD CKD-EPI 2021: 28 ML/MIN/1.73M2
EGFRCR SERPLBLD CKD-EPI 2021: 30 ML/MIN/1.73M2
ERYTHROCYTE [DISTWIDTH] IN BLOOD BY AUTOMATED COUNT: 11.1 % (ref 10–15)
FRACT EXCRET NA UR+SERPL-RTO: 0.9 %
GLUCOSE SERPL-MCNC: 115 MG/DL (ref 70–99)
GLUCOSE SERPL-MCNC: 139 MG/DL (ref 70–99)
HCO3 BLDV-SCNC: 33 MMOL/L (ref 21–28)
HCO3 BLDV-SCNC: 37 MMOL/L (ref 21–28)
HCO3 SERPL-SCNC: 27 MMOL/L (ref 22–29)
HCO3 SERPL-SCNC: 32 MMOL/L (ref 22–29)
HCT VFR BLD AUTO: 36.4 % (ref 40–53)
HGB BLD-MCNC: 12.5 G/DL (ref 13.3–17.7)
MAGNESIUM SERPL-MCNC: 2.7 MG/DL (ref 1.7–2.3)
MCH RBC QN AUTO: 30.9 PG (ref 26.5–33)
MCHC RBC AUTO-ENTMCNC: 34.3 G/DL (ref 31.5–36.5)
MCV RBC AUTO: 90 FL (ref 78–100)
O2/TOTAL GAS SETTING VFR VENT: 21 %
O2/TOTAL GAS SETTING VFR VENT: 21 %
OXYHGB MFR BLDV: 79 % (ref 70–75)
OXYHGB MFR BLDV: 88 % (ref 70–75)
PCO2 BLDV: 41 MM HG (ref 40–50)
PCO2 BLDV: 49 MM HG (ref 40–50)
PH BLDV: 7.49 [PH] (ref 7.32–7.43)
PH BLDV: 7.52 [PH] (ref 7.32–7.43)
PLATELET # BLD AUTO: 238 10E3/UL (ref 150–450)
PO2 BLDV: 44 MM HG (ref 25–47)
PO2 BLDV: 55 MM HG (ref 25–47)
POTASSIUM SERPL-SCNC: 3.6 MMOL/L (ref 3.4–5.3)
POTASSIUM SERPL-SCNC: 3.6 MMOL/L (ref 3.4–5.3)
POTASSIUM SERPL-SCNC: 3.9 MMOL/L (ref 3.4–5.3)
RBC # BLD AUTO: 4.05 10E6/UL (ref 4.4–5.9)
SAO2 % BLDV: 80.7 % (ref 70–75)
SAO2 % BLDV: 90.1 % (ref 70–75)
SODIUM SERPL-SCNC: 134 MMOL/L (ref 135–145)
SODIUM SERPL-SCNC: 137 MMOL/L (ref 135–145)
SODIUM UR-SCNC: 31 MMOL/L
WBC # BLD AUTO: 12.3 10E3/UL (ref 4–11)

## 2025-07-21 PROCEDURE — 120N000004 HC R&B MS OVERFLOW

## 2025-07-21 PROCEDURE — 250N000013 HC RX MED GY IP 250 OP 250 PS 637: Performed by: STUDENT IN AN ORGANIZED HEALTH CARE EDUCATION/TRAINING PROGRAM

## 2025-07-21 PROCEDURE — 82570 ASSAY OF URINE CREATININE: CPT | Performed by: STUDENT IN AN ORGANIZED HEALTH CARE EDUCATION/TRAINING PROGRAM

## 2025-07-21 PROCEDURE — 82436 ASSAY OF URINE CHLORIDE: CPT | Performed by: STUDENT IN AN ORGANIZED HEALTH CARE EDUCATION/TRAINING PROGRAM

## 2025-07-21 PROCEDURE — 82550 ASSAY OF CK (CPK): CPT | Performed by: STUDENT IN AN ORGANIZED HEALTH CARE EDUCATION/TRAINING PROGRAM

## 2025-07-21 PROCEDURE — 36415 COLL VENOUS BLD VENIPUNCTURE: CPT | Performed by: STUDENT IN AN ORGANIZED HEALTH CARE EDUCATION/TRAINING PROGRAM

## 2025-07-21 PROCEDURE — 83735 ASSAY OF MAGNESIUM: CPT | Performed by: STUDENT IN AN ORGANIZED HEALTH CARE EDUCATION/TRAINING PROGRAM

## 2025-07-21 PROCEDURE — 258N000003 HC RX IP 258 OP 636: Performed by: PHYSICIAN ASSISTANT

## 2025-07-21 PROCEDURE — 99232 SBSQ HOSP IP/OBS MODERATE 35: CPT | Performed by: STUDENT IN AN ORGANIZED HEALTH CARE EDUCATION/TRAINING PROGRAM

## 2025-07-21 PROCEDURE — 82805 BLOOD GASES W/O2 SATURATION: CPT | Performed by: STUDENT IN AN ORGANIZED HEALTH CARE EDUCATION/TRAINING PROGRAM

## 2025-07-21 PROCEDURE — 85014 HEMATOCRIT: CPT | Performed by: STUDENT IN AN ORGANIZED HEALTH CARE EDUCATION/TRAINING PROGRAM

## 2025-07-21 PROCEDURE — 84132 ASSAY OF SERUM POTASSIUM: CPT | Performed by: STUDENT IN AN ORGANIZED HEALTH CARE EDUCATION/TRAINING PROGRAM

## 2025-07-21 PROCEDURE — 250N000013 HC RX MED GY IP 250 OP 250 PS 637: Performed by: PHYSICIAN ASSISTANT

## 2025-07-21 PROCEDURE — 250N000011 HC RX IP 250 OP 636: Performed by: PHYSICIAN ASSISTANT

## 2025-07-21 PROCEDURE — 84133 ASSAY OF URINE POTASSIUM: CPT | Performed by: STUDENT IN AN ORGANIZED HEALTH CARE EDUCATION/TRAINING PROGRAM

## 2025-07-21 RX ORDER — HYDRALAZINE HYDROCHLORIDE 25 MG/1
25 TABLET, FILM COATED ORAL EVERY 8 HOURS SCHEDULED
Status: DISCONTINUED | OUTPATIENT
Start: 2025-07-21 | End: 2025-07-22 | Stop reason: HOSPADM

## 2025-07-21 RX ADMIN — GABAPENTIN 300 MG: 300 CAPSULE ORAL at 05:28

## 2025-07-21 RX ADMIN — BENZOCAINE 6 MG-MENTHOL 10 MG LOZENGES 1 LOZENGE: at 17:10

## 2025-07-21 RX ADMIN — THIAMINE HYDROCHLORIDE 200 MG: 100 INJECTION, SOLUTION INTRAMUSCULAR; INTRAVENOUS at 08:23

## 2025-07-21 RX ADMIN — PHENOL 1 ML: 1.5 LIQUID ORAL at 17:04

## 2025-07-21 RX ADMIN — PANTOPRAZOLE SODIUM 40 MG: 40 INJECTION, POWDER, LYOPHILIZED, FOR SOLUTION INTRAVENOUS at 21:11

## 2025-07-21 RX ADMIN — Medication 1 TABLET: at 08:22

## 2025-07-21 RX ADMIN — THIAMINE HYDROCHLORIDE 200 MG: 100 INJECTION, SOLUTION INTRAMUSCULAR; INTRAVENOUS at 13:13

## 2025-07-21 RX ADMIN — PANTOPRAZOLE SODIUM 40 MG: 40 INJECTION, POWDER, LYOPHILIZED, FOR SOLUTION INTRAVENOUS at 08:26

## 2025-07-21 RX ADMIN — HYDRALAZINE HYDROCHLORIDE 25 MG: 25 TABLET ORAL at 15:48

## 2025-07-21 RX ADMIN — FOLIC ACID 1 MG: 5 INJECTION, SOLUTION INTRAMUSCULAR; INTRAVENOUS; SUBCUTANEOUS at 08:42

## 2025-07-21 RX ADMIN — THIAMINE HCL TAB 100 MG 100 MG: 100 TAB at 21:08

## 2025-07-21 RX ADMIN — HYDRALAZINE HYDROCHLORIDE 25 MG: 25 TABLET ORAL at 21:09

## 2025-07-21 RX ADMIN — QUETIAPINE FUMARATE 25 MG: 25 TABLET ORAL at 21:08

## 2025-07-21 RX ADMIN — GABAPENTIN 300 MG: 300 CAPSULE ORAL at 21:08

## 2025-07-21 RX ADMIN — SODIUM CHLORIDE, POTASSIUM CHLORIDE, SODIUM LACTATE AND CALCIUM CHLORIDE: 600; 310; 30; 20 INJECTION, SOLUTION INTRAVENOUS at 12:49

## 2025-07-21 RX ADMIN — BENZOCAINE 6 MG-MENTHOL 10 MG LOZENGES 1 LOZENGE: at 21:08

## 2025-07-21 RX ADMIN — GABAPENTIN 300 MG: 300 CAPSULE ORAL at 13:12

## 2025-07-21 RX ADMIN — SODIUM CHLORIDE, POTASSIUM CHLORIDE, SODIUM LACTATE AND CALCIUM CHLORIDE: 600; 310; 30; 20 INJECTION, SOLUTION INTRAVENOUS at 19:37

## 2025-07-21 RX ADMIN — SODIUM CHLORIDE, POTASSIUM CHLORIDE, SODIUM LACTATE AND CALCIUM CHLORIDE: 600; 310; 30; 20 INJECTION, SOLUTION INTRAVENOUS at 06:26

## 2025-07-21 ASSESSMENT — ACTIVITIES OF DAILY LIVING (ADL)
ADLS_ACUITY_SCORE: 28
ADLS_ACUITY_SCORE: 27
ADLS_ACUITY_SCORE: 28
ADLS_ACUITY_SCORE: 27
ADLS_ACUITY_SCORE: 28
ADLS_ACUITY_SCORE: 27
ADLS_ACUITY_SCORE: 28

## 2025-07-21 NOTE — PLAN OF CARE
End Of Shift Note    Situation: Pt admitted d/t dehydration with significantly altered lab results after binge drinking, being in the sun, vomiting multiple times.     Plan: con't to treat and monitor for alcohol withdrawal, nausea, sore throat     Subjective/Objective:    Neuro: A.Ox4     Cardiac: HTN needing Metoprolol 5 mg IV with some improvement. SR on monitor.     Resp: LS clear bilat, on RA     GI/: intermittent nausea. Zofran and Viscous Lidocaine helpful (also with sore throat).     Endo: CK 2287 - other labs improving. Monitoring Potassium levels and treating low values.     MSK: Generalized weakness. Slept a lot today.     Skin: Diffuse sunburn with blistering/peeling to tops of bilat feet     LDAs: PIV x3. LR infusing.        Goal Outcome Evaluation:      Plan of Care Reviewed With: patient    Overall Patient Progress: improvingOverall Patient Progress: improving    Outcome Evaluation: labs slowly improving, pt feeling better - con't to have sore throat and slept a lot today. His Dad and his girlfriend came to visit. HTN needing Metoprolol 5 mg IV. CIWA scores 2-4 today.

## 2025-07-21 NOTE — PROGRESS NOTES
[facility]    Medicine Progress Note - Hospitalist Service    Date of Admission:  7/19/2025    Assessment & Plan      Brenden Bishop is a 29 year old male admitted on 7/19/2025. He has a past medical history significant for alcohol use disorder, bipolar 1, ADHD, anxiety, and depression who presented to the emergency department for evaluation of intractable nausea and vomiting in the setting of recent alcohol binge and was found to have an acute kidney injury, metabolic alkalosis and respiratory acidosis, A-fib RVR, and multiple other metabolic derangements.    Acute kidney injury secondary to rhabdomyolysis   Underlying CKD?  Admit creatinine 4.62 (baseline 0.9 - 1.1), GFR 16 (baseline > 60). Occurs in the setting of severe nausea and vomiting as well as alcohol binge, suspect prerenal due to severe dehydration. Is making urine, renal function gradually improving after initiation of IV fluids. No acute indication for dialysis at this time. UA with large blood and no RBC's so possibly concerning for rhabdomyolysis, which came back positive CK 2,287. Cr improving 3.55 ? 3.12 ? 2.94 with IVF. Denies any crush injuries or prolonged period of being down.     Majority of patients with severe rhabdomyolysis and BEN do not recover baseline renal function within the first 24-48 hours; instead, recovery is observed over several days, and even among those who ultimately achieved full recovery, normalization of creatinine lagged behind clinical improvement. Additional studies further support that persistent elevation of creatinine at 24 hours is common and not concerning in the absence of other red flags. The presence of robust urine output (6.7 L since admission) is a favorable for full recovery, indicating that the kidneys are able to excrete solute and water, and that the oliguric phase of BEN has resolved.   - Strict I/O's, monitor for decrease in UOP   - Continue LR @ 150 ml/hr  - BMP q12h  "    Alcohol use disorder  Alcohol withdrawal syndrome  Not a daily drinker, but does binge drink heavily. Last period of sobriety lasted about 3 months, then started drinking about 3 days ago. Has been drinking a fifth of vodka daily x 3 days, last drink evening of 7/18, the developed intractable vomiting and unable to drink more. Started getting tremulous and anxious morning 7/19, no seizures. Started on CIWA in the emergency department.   - Monitor on CIWA with Valium available  - Gabapentin taper  - No clonidine ordered  - MVI, thiamine, folic acid    Metabolic alkalosis with respiratory acidosis  Admission VBG appears to be mixed metabolic alkalosis with compensatory respiratory acidosis - pH 7.58 / pCO2 60 / bicarb 57. No wheezes on exam to suggest COPD / asthma exacerbation driving the pCO2 elevation. High bicarb possibly due to GI losses of HCl secondary to vomiting given degree of elevation.   - IVF as above   - Repeat VBG q12h    Hypokalemia - improving  Hypermagnesemia   Hyperphosphatemia  Hypochloremia - improving  Hyponatremia - improving  Admission K WNL (4.3), but then dropped to 2.6.   Mg WNL (2.3) ?  2.8 (high)  Phos high (7.7) ? 3.8 (wnl)  Chloride <60 ?  64 ?  65 (low) ? 77  Sodium 126 ? 130 ? 134  Multiple electrolyte abnormalities likely due to some losses from nausea / vomiting and poor oral intake.   - Repeat electrolytes again in am     Leukocytosis   Thrombocytosis   Admission WBC 30.1, hemoglobin 18.5, platelets 485. UA and chest x-ray not suggestive of infection.     CT abdomen & pelvis - \"1.  No acute pathology identified in abdomen or pelvis.  2.  Normal noncontrast appearance of the pancreas. No evidence of acute pancreatitis.  3.  Tiny sliding-type hiatal hernia of doubtful clinical significance.\"    Likely hemo concentrated in the setting of severe dehydration, which appears to be improving with IVF and resolution of BEN/electrolyte abnormalities. 30.1 ? 18.2 ? 12.3.  - CBC am   - " Observe off antibiotics, monitor for fever    Atrial fibrillation with RVR, resolved  Elevated troponin  Elevated brain natriuretic peptide (BNP) level  Presented with heart rate as high as 190, EKG with new atrial fibrillation RVR. Was given diltiazem 25 mg and metoprolol 25 mg as well as IV fluids, patient spontaneously converted to NSR. Atrial fibrillation likely provoked by profound dehydration, alcohol, and electrolyte abnormalities.   - Monitor on telemetry  - No anticoagulation at this time    Intractable nausea and vomiting, suspect alcoholic gastritis  Chemical pharyngitis   Acute onset of intractable nausea and vomiting in the setting of alcohol binge. Non-bloody emesis. Likely has alcoholic gastritis and pharyngitis secondary to vomiting.   - Chloraseptic spray and viscous lidocaine for PRN relief  - IV Protonix    Bipolar 1 disorder  Major depressive disorder, recurrent, in partial remission  Panic attacks  Managed prior to admission with Seroquel 25 mg qhs, continue.           Diet: Regular Diet Adult    DVT Prophylaxis: Pneumatic Compression Devices  Mancilla Catheter: Not present  Lines: None     Cardiac Monitoring: None  Code Status: Full Code      Clinically Significant Risk Factors        # Hypokalemia: Lowest K = 2.5 mmol/L in last 2 days, will replace as needed  # Hyponatremia: Lowest Na = 126 mmol/L in last 2 days, will monitor as appropriate  # Hypochloremia: Lowest Cl = 65 mmol/L in last 2 days, will monitor as appropriate  # Hypocalcemia: Lowest Ca = 8 mg/dL in last 2 days, will monitor and replace as appropriate                        # Asthma: noted on problem list        Social Drivers of Health    Tobacco Use: Medium Risk (5/19/2025)    Received from [employer]    Patient History     Smoking Tobacco Use: Former     Smokeless Tobacco Use: Former          Disposition Plan     Medically Ready for Discharge: Anticipated Tomorrow           Gerson James DO  Hospitalist Service  [facility]  [facility]  Securely message with [employer] (more info)  Text page via [employer] Paging/Directory   ______________________________________________________________________    Interval History   No acute events overnight. Continues to have great UOP. Cr slightly increased from yesterday 2.73 ? 2.94 despite IV fluids. Likely lagging clinical improvement given typical timeline for rhabo-induced BEN. Metabolic alkalosis slowly improving with IV fluids. No additional bouts of nausea or vomiting.       Physical Exam   Vital Signs: Temp: 98.8  F (37.1  C) Temp src: Oral BP: (!) 176/110 Pulse: 82   Resp: 16 SpO2: 95 % O2 Device: None (Room air)    Weight: 166 lbs 7.16 oz    Constitutional: awake, alert, cooperative, no apparent distress, and appears stated age  Respiratory: No increased work of breathing, good air exchange, clear to auscultation bilaterally, no crackles or wheezing  Cardiovascular: Normal apical impulse, regular rate and rhythm, normal S1 and S2, no S3 or S4, and no murmur noted      Medical Decision Making       35 MINUTES SPENT BY ME on the date of service doing chart review, history, exam, documentation & further activities per the note.      Data     I have personally reviewed the following data over the past 24 hrs:    12.3 (H)  \   12.5 (L)   / 238     134 (L) 93 (L) 47.9 (H) /  115 (H)   3.6 32 (H) 2.94 (H) \       Imaging results reviewed over the past 24 hrs:   No results found for this or any previous visit (from the past 24 hours).

## 2025-07-21 NOTE — PLAN OF CARE
End of Shift Note    Situation: Patient admitted d/t dehydration & lab alteration due to binge drinking & vomiting.     Plan: Continue to treat lab abnormalities, nausea. Monitor CIWA scores.     Subjective/Objective    Neuro: A&OX4 - CIWA scores continue to be between 2-3 on assessment.     Cardiac:Slightly hypertensive. Running SR on monitor.  Replaced K x1 overnight.     Resp:Lung sounds remain clear bilaterally. Saturating >90% on RA.     GI/: Reports less nausea overnight. Tolerated a regular diet and ate a microwaved meal and a turkey/cheese sandwich. Voiding in the urinal. Over 1L uop through the night. No vomiting overnight.      MSK:Generalized weakness.     Skin:Peeling on tops of feet d/t sunburn. Diffuse sunburn. No additional deficits identified.     LDA:X2 PIV.     Goal Outcome Evaluation:  Patient states continues to feel better with less nausea after regular diet. Zofran given x1 at beginning of shift r/t nausea. CIWA scores between 2-3 on Q4H assessments.

## 2025-07-22 VITALS
RESPIRATION RATE: 16 BRPM | OXYGEN SATURATION: 98 % | DIASTOLIC BLOOD PRESSURE: 114 MMHG | WEIGHT: 166.45 LBS | TEMPERATURE: 98.2 F | BODY MASS INDEX: 23.83 KG/M2 | SYSTOLIC BLOOD PRESSURE: 171 MMHG | HEIGHT: 70 IN | HEART RATE: 80 BPM

## 2025-07-22 LAB
ANION GAP SERPL CALCULATED.3IONS-SCNC: 11 MMOL/L (ref 7–15)
BASE EXCESS BLDV CALC-SCNC: 8.2 MMOL/L (ref -3–3)
BUN SERPL-MCNC: 38.5 MG/DL (ref 6–20)
CALCIUM SERPL-MCNC: 8.3 MG/DL (ref 8.8–10.4)
CHLORIDE SERPL-SCNC: 102 MMOL/L (ref 98–107)
CHLORIDE UR-SCNC: 22 MMOL/L
CK SERPL-CCNC: 626 U/L (ref 39–308)
CREAT SERPL-MCNC: 2.5 MG/DL (ref 0.67–1.17)
EGFRCR SERPLBLD CKD-EPI 2021: 34 ML/MIN/1.73M2
GLUCOSE SERPL-MCNC: 102 MG/DL (ref 70–99)
HCO3 BLDV-SCNC: 32 MMOL/L (ref 21–28)
HCO3 SERPL-SCNC: 25 MMOL/L (ref 22–29)
O2/TOTAL GAS SETTING VFR VENT: 21 %
OXYHGB MFR BLDV: 89 % (ref 70–75)
PCO2 BLDV: 42 MM HG (ref 40–50)
PH BLDV: 7.5 [PH] (ref 7.32–7.43)
PO2 BLDV: 57 MM HG (ref 25–47)
POTASSIUM SERPL-SCNC: 3.9 MMOL/L (ref 3.4–5.3)
POTASSIUM UR-SCNC: 25.8 MMOL/L
SAO2 % BLDV: 91.1 % (ref 70–75)
SODIUM SERPL-SCNC: 138 MMOL/L (ref 135–145)

## 2025-07-22 PROCEDURE — 250N000013 HC RX MED GY IP 250 OP 250 PS 637: Performed by: STUDENT IN AN ORGANIZED HEALTH CARE EDUCATION/TRAINING PROGRAM

## 2025-07-22 PROCEDURE — 250N000013 HC RX MED GY IP 250 OP 250 PS 637: Performed by: PHYSICIAN ASSISTANT

## 2025-07-22 PROCEDURE — 99239 HOSP IP/OBS DSCHRG MGMT >30: CPT | Performed by: STUDENT IN AN ORGANIZED HEALTH CARE EDUCATION/TRAINING PROGRAM

## 2025-07-22 PROCEDURE — 80048 BASIC METABOLIC PNL TOTAL CA: CPT | Performed by: STUDENT IN AN ORGANIZED HEALTH CARE EDUCATION/TRAINING PROGRAM

## 2025-07-22 PROCEDURE — 250N000009 HC RX 250: Performed by: PHYSICIAN ASSISTANT

## 2025-07-22 PROCEDURE — 250N000011 HC RX IP 250 OP 636: Performed by: PHYSICIAN ASSISTANT

## 2025-07-22 PROCEDURE — 82805 BLOOD GASES W/O2 SATURATION: CPT | Performed by: STUDENT IN AN ORGANIZED HEALTH CARE EDUCATION/TRAINING PROGRAM

## 2025-07-22 PROCEDURE — 258N000003 HC RX IP 258 OP 636: Performed by: PHYSICIAN ASSISTANT

## 2025-07-22 PROCEDURE — 82550 ASSAY OF CK (CPK): CPT | Performed by: STUDENT IN AN ORGANIZED HEALTH CARE EDUCATION/TRAINING PROGRAM

## 2025-07-22 PROCEDURE — 36415 COLL VENOUS BLD VENIPUNCTURE: CPT | Performed by: STUDENT IN AN ORGANIZED HEALTH CARE EDUCATION/TRAINING PROGRAM

## 2025-07-22 RX ORDER — PROPRANOLOL HYDROCHLORIDE 60 MG/1
60 CAPSULE, EXTENDED RELEASE ORAL DAILY
Status: DISCONTINUED | OUTPATIENT
Start: 2025-07-22 | End: 2025-07-22 | Stop reason: HOSPADM

## 2025-07-22 RX ORDER — NALTREXONE HYDROCHLORIDE 50 MG/1
50 TABLET, FILM COATED ORAL DAILY
Qty: 30 TABLET | Refills: 0 | Status: SHIPPED | OUTPATIENT
Start: 2025-07-22

## 2025-07-22 RX ORDER — LANOLIN ALCOHOL/MO/W.PET/CERES
100 CREAM (GRAM) TOPICAL DAILY
Qty: 30 TABLET | Refills: 0 | Status: SHIPPED | OUTPATIENT
Start: 2025-07-26

## 2025-07-22 RX ORDER — QUETIAPINE FUMARATE 25 MG/1
25 TABLET, FILM COATED ORAL AT BEDTIME
Qty: 30 TABLET | Refills: 3 | Status: SHIPPED | OUTPATIENT
Start: 2025-07-22

## 2025-07-22 RX ORDER — GABAPENTIN 300 MG/1
300 CAPSULE ORAL EVERY 8 HOURS
Qty: 90 CAPSULE | Refills: 0 | Status: SHIPPED | OUTPATIENT
Start: 2025-07-22

## 2025-07-22 RX ORDER — PROPRANOLOL HYDROCHLORIDE 60 MG/1
60 CAPSULE, EXTENDED RELEASE ORAL DAILY
Qty: 30 CAPSULE | Refills: 0 | Status: SHIPPED | OUTPATIENT
Start: 2025-07-22

## 2025-07-22 RX ORDER — ACETAMINOPHEN 325 MG/1
650 TABLET ORAL EVERY 4 HOURS PRN
Qty: 90 TABLET | Refills: 0 | Status: SHIPPED | OUTPATIENT
Start: 2025-07-22

## 2025-07-22 RX ORDER — QUETIAPINE FUMARATE 25 MG/1
25 TABLET, FILM COATED ORAL AT BEDTIME
Qty: 30 TABLET | Refills: 0 | Status: SHIPPED | OUTPATIENT
Start: 2025-07-22

## 2025-07-22 RX ADMIN — Medication 1 TABLET: at 08:15

## 2025-07-22 RX ADMIN — THIAMINE HCL TAB 100 MG 100 MG: 100 TAB at 08:15

## 2025-07-22 RX ADMIN — METOPROLOL TARTRATE 5 MG: 5 INJECTION INTRAVENOUS at 02:17

## 2025-07-22 RX ADMIN — GABAPENTIN 300 MG: 300 CAPSULE ORAL at 05:32

## 2025-07-22 RX ADMIN — PANTOPRAZOLE SODIUM 40 MG: 40 INJECTION, POWDER, LYOPHILIZED, FOR SOLUTION INTRAVENOUS at 08:16

## 2025-07-22 RX ADMIN — HYDRALAZINE HYDROCHLORIDE 25 MG: 25 TABLET ORAL at 05:32

## 2025-07-22 RX ADMIN — SODIUM CHLORIDE, POTASSIUM CHLORIDE, SODIUM LACTATE AND CALCIUM CHLORIDE: 600; 310; 30; 20 INJECTION, SOLUTION INTRAVENOUS at 02:12

## 2025-07-22 RX ADMIN — FOLIC ACID 1 MG: 1 TABLET ORAL at 08:15

## 2025-07-22 RX ADMIN — PROPRANOLOL HYDROCHLORIDE 60 MG: 60 CAPSULE, EXTENDED RELEASE ORAL at 10:54

## 2025-07-22 ASSESSMENT — ACTIVITIES OF DAILY LIVING (ADL)
ADLS_ACUITY_SCORE: 27

## 2025-07-22 NOTE — PLAN OF CARE
End Of Shift Note     Situation: BEN r/t ETOH vomiting.      Plan: Monitor kidney function.     Subjective/Objective:     Neuro: A&O x4 using call light to make needs known.     Cardiac: SR     Resp: WNL     GI/: WNL     MSK: WNL Ind in room.     Skin: WNL     LDAs: x2 PIV

## 2025-07-22 NOTE — DISCHARGE SUMMARY
[facility]  Hospitalist Discharge Summary      Date of Admission:  7/19/2025  Date of Discharge:  7/22/2025  Discharging Provider: Gerson James DO  Discharge Service: Hospitalist Service    Discharge Diagnoses   Acute kidney injury secondary to rhabdomyolysis   Underlying CKD?  Alcohol use disorder  Alcohol withdrawal syndrome  Metabolic alkalosis with respiratory acidosis   Hypokalemia - improving  Hypermagnesemia   Hyperphosphatemia  Hypochloremia - improving  Hyponatremia - improving  Leukocytosis   Thrombocytosis   Atrial fibrillation with RVR, resolved  Elevated troponin  Elevated brain natriuretic peptide (BNP) level  Intractable nausea and vomiting, suspect alcoholic gastritis  Chemical pharyngitis   Bipolar 1 disorder  Major depressive disorder, recurrent, in partial remission  Panic attacks    Clinically Significant Risk Factors          Follow-ups Needed After Discharge   Follow-up Appointments       Hospital Follow-up with Existing Primary Care Provider (PCP)          Schedule Primary Care visit within: 30 Days             Unresulted Labs Ordered in the Past 30 Days of this Admission       Date and Time Order Name Status Description    7/22/2025  7:21 AM Potassium random urine In process     7/22/2025  7:21 AM Chloride random urine In process         These results will be followed up by PCP.    Discharge Disposition   Discharged to home  Condition at discharge: Stable    Hospital Course   Brenden Bishop is a 29 year old male admitted on 7/19/2025. He has a past medical history significant for alcohol use disorder, bipolar 1, ADHD, anxiety, and depression who presented to the emergency department for evaluation of intractable nausea and vomiting in the setting of recent alcohol binge and was found to have an acute kidney injury, metabolic alkalosis and respiratory acidosis, A-fib RVR, and multiple other metabolic derangements.    Acute kidney injury secondary to rhabdomyolysis    Underlying CKD?  Admit creatinine 4.62 (baseline 0.9 - 1.1), GFR 16 (baseline > 60). Occurs in the setting of severe nausea and vomiting as well as alcohol binge, suspect prerenal due to severe dehydration. Is making urine, renal function gradually improving after initiation of IV fluids. No acute indication for dialysis at this time. UA with large blood and no RBC's so possibly concerning for rhabdomyolysis, which came back positive CK 2,287. Cr improving 3.55 ? 3.12 ? 2.94 with IVF. Denies any crush injuries or prolonged period of being down.     Majority of patients with severe rhabdomyolysis and BEN do not recover baseline renal function within the first 24-48 hours; instead, recovery is observed over several days, and even among those who ultimately achieved full recovery, normalization of creatinine lagged behind clinical improvement. Additional studies further support that persistent elevation of creatinine at 24 hours is common and not concerning in the absence of other red flags. The presence of robust urine output (6.7 L since admission) is a favorable for full recovery, indicating that the kidneys are able to excrete solute and water, and that the oliguric phase of BEN has resolved.   - Repeat BMP in 1 week  - Avoid nephrotoxic medications (NSAID's)    Alcohol use disorder  Alcohol withdrawal syndrome  Not a daily drinker, but does binge drink heavily. Last period of sobriety lasted about 3 months, then started drinking about 3 days ago. Has been drinking a fifth of vodka daily x 3 days, last drink evening of 7/18, the developed intractable vomiting and unable to drink more. Started getting tremulous and anxious morning 7/19, no seizures. Started on CIWA in the emergency department. Patient requesting vivitriol injection but prohibited by insurance as an inpatient so will send patient with naltrexone tablets.   - Naltrexone 50 mg daily (refilled)  - Gabapentin 300 mg TID     Metabolic alkalosis with  "respiratory acidosis  Admission VBG appears to be mixed metabolic alkalosis with compensatory respiratory acidosis - pH 7.58 / pCO2 60 / bicarb 57. No wheezes on exam to suggest COPD / asthma exacerbation driving the pCO2 elevation. High bicarb possibly due to GI losses of HCl secondary to vomiting given degree of elevation. Persistent metabolic alkalosis after BEN and rhabdomyolysis is most often due to impaired renal bicarbonate excretion, volume contraction, hypokalemia, and/or chloride depletion.    Hypokalemia - improving  Hypermagnesemia   Hyperphosphatemia  Hypochloremia - improving  Hyponatremia - improving  Admission K WNL (4.3), but then dropped to 2.6.   Mg WNL (2.3) ?  2.8 (high)  Phos high (7.7) ? 3.8 (wnl)  Chloride <60 ?  64 ?  65 (low) ? 77  Sodium 126 ? 130 ? 134  Multiple electrolyte abnormalities likely due to some losses from nausea / vomiting and poor oral intake.   - Repeat BMP in 1 week    Leukocytosis - resolved   Thrombocytosis   Admission WBC 30.1, hemoglobin 18.5, platelets 485. UA and chest x-ray not suggestive of infection.     CT abdomen & pelvis - \"1.  No acute pathology identified in abdomen or pelvis.  2.  Normal noncontrast appearance of the pancreas. No evidence of acute pancreatitis.  3.  Tiny sliding-type hiatal hernia of doubtful clinical significance.\"    Likely hemo concentrated in the setting of severe dehydration, which appears to be improving with IVF and resolution of BEN/electrolyte abnormalities. 30.1 ? 18.2 ? 12.3.    Atrial fibrillation with RVR, resolved  Elevated troponin  Elevated brain natriuretic peptide (BNP) level  Presented with heart rate as high as 190, EKG with new atrial fibrillation RVR. Was given diltiazem 25 mg and metoprolol 25 mg as well as IV fluids, patient spontaneously converted to NSR. Atrial fibrillation likely provoked by profound dehydration, alcohol, and electrolyte abnormalities.     Intractable nausea and vomiting, suspect alcoholic " gastritis - resolved   Chemical pharyngitis   Acute onset of intractable nausea and vomiting in the setting of alcohol binge. Non-bloody emesis. Likely has alcoholic gastritis and pharyngitis secondary to vomiting.     Bipolar 1 disorder  Major depressive disorder, recurrent, in partial remission  Panic attacks  Managed prior to admission with Seroquel 25 mg qhs, continue.     Consultations This Hospital Stay   CARE MANAGEMENT / SOCIAL WORK IP CONSULT    Code Status   Full Code    Time Spent on this Encounter   I, Gerson James DO, personally saw the patient today and spent greater than 30 minutes discharging this patient.       Gerson James DO  [facility]ENSIVE [facility]  [address] [address]  Phone: [phone]  Fax: [phone]  ______________________________________________________________________    Physical Exam   Vital Signs: Temp: 98.2  F (36.8  C) Temp src: Oral BP: (!) 171/114 Pulse: 80   Resp: 16 SpO2: 98 % O2 Device: None (Room air)    Weight: 166 lbs 7.16 oz    Constitutional: awake, alert, cooperative, no apparent distress, and appears stated age  Respiratory: No increased work of breathing, good air exchange, clear to auscultation bilaterally, no crackles or wheezing  Cardiovascular: Normal apical impulse, regular rate and rhythm, normal S1 and S2, no S3 or S4, and no murmur noted       Primary Care Physician   [facility]    Discharge Orders      Basic metabolic panel     Reason for your hospital stay    Acute kidney injury, rhabdomyolysis, metabolic alkalosis with respiratory acidosis, multiple electrolyte abnormalities     Activity    Your activity upon discharge: activity as tolerated     Diet    Follow this diet upon discharge:    Regular Diet Adult     Hospital Follow-up with Existing Primary Care Provider (PCP)            Significant Results and Procedures   Most Recent 3 CBC's:  Recent Labs   Lab Test 07/21/25  0629 07/20/25  3125  07/20/25  0006   WBC 12.3* 18.2* 19.8*   HGB 12.5* 13.9 14.3   MCV 90 88 88    321 316     Most Recent 3 BMP's:  Recent Labs   Lab Test 07/22/25  0650 07/21/25  1823 07/21/25  0629    137 134*   POTASSIUM 3.9 3.9 3.6   CHLORIDE 102 97* 93*   CO2 25 27 32*   BUN 38.5* 43.2* 47.9*   CR 2.50* 2.80* 2.94*   ANIONGAP 11 13 9   REBA 8.3* 8.2* 8.6*   * 139* 115*   ,   Results for orders placed or performed during the hospital encounter of 07/19/25   XR Chest Port 1 View    Narrative    EXAM: XR CHEST PORT 1 VIEW  LOCATION: [facility]  DATE: 7/19/2025    INDICATION: tachycardia  COMPARISON: 11/8/2024      Impression    IMPRESSION: Negative chest.   CT Abdomen Pelvis w/o Contrast    Narrative    EXAM: CT ABDOMEN PELVIS W/O CONTRAST  LOCATION: [facility]  DATE: 7/19/2025    INDICATION: elevated lipase, abnormal LFTs, BEN  COMPARISON: 11/8/2024  TECHNIQUE: CT scan of the abdomen and pelvis was performed without IV contrast. Multiplanar reformats were obtained. Dose reduction techniques were used.  CONTRAST: None.    FINDINGS:   LOWER CHEST: Normal.    HEPATOBILIARY: Normal.    PANCREAS: Normal noncontrast appearance of the pancreas. No evidence of acute pancreatitis. No adjacent fluid collections.    SPLEEN: Normal.    ADRENAL GLANDS: Normal.    KIDNEYS/BLADDER: Normal. No ureteral stones or hydronephrosis.    BOWEL: Tiny sliding-type hiatal hernia of doubtful clinical significance. Bowel loops are normal caliber. Normal appendix.    LYMPH NODES: Normal.    VASCULATURE: Normal.    PELVIC ORGANS: Normal.    MUSCULOSKELETAL: Normal.      Impression    IMPRESSION:   1.  No acute pathology identified in abdomen or pelvis.  2.  Normal noncontrast appearance of the pancreas. No evidence of acute pancreatitis.  3.  Tiny sliding-type hiatal hernia of doubtful clinical significance.           Discharge Medications      Review of your medicines        START  taking        Dose / Directions   acetaminophen 325 MG tablet  Commonly known as: TYLENOL  Used for: Nonintractable headache, unspecified chronicity pattern, unspecified headache type      Dose: 650 mg  Take 2 tablets (650 mg) by mouth every 4 hours as needed for mild pain, fever or headaches.  Quantity: 90 tablet  Refills: 0     gabapentin 300 MG capsule  Commonly known as: NEURONTIN  Used for: Alcohol use disorder      Dose: 300 mg  Take 1 capsule (300 mg) by mouth every 8 hours.  Quantity: 90 capsule  Refills: 0     naltrexone 50 MG tablet  Commonly known as: DEPADE/REVIA  Used for: Alcohol use disorder      Dose: 50 mg  Take 1 tablet (50 mg) by mouth daily.  Quantity: 30 tablet  Refills: 0     propranolol ER 60 MG 24 hr capsule  Commonly known as: INDERAL LA  Used for: Anxiety state, Essential hypertension      Dose: 60 mg  Take 1 capsule (60 mg) by mouth daily.  Quantity: 30 capsule  Refills: 0     thiamine 100 MG tablet  Commonly known as: B-1  Used for: Alcohol use disorder      Dose: 100 mg  Start taking on: July 26, 2025  Take 1 tablet (100 mg) by mouth daily.  Quantity: 30 tablet  Refills: 0            CHANGE how you take these medications        Dose / Directions   * QUEtiapine 25 MG tablet  Commonly known as: SEROquel  This may have changed: Another medication with the same name was added. Make sure you understand how and when to take each.  Used for: Major depressive disorder, recurrent, in partial remission, Panic attacks      Dose: 25 mg  Take 1 tablet (25 mg) by mouth at bedtime.  Quantity: 30 tablet  Refills: 3     * QUEtiapine 25 MG tablet  Commonly known as: SEROquel  This may have changed: You were already taking a medication with the same name, and this prescription was added. Make sure you understand how and when to take each.  Used for: Bipolar 1 disorder (H)      Dose: 25 mg  Take 1 tablet (25 mg) by mouth at bedtime.  Quantity: 30 tablet  Refills: 0           * This list has 2 medication(s)  that are the same as other medications prescribed for you. Read the directions carefully, and ask your doctor or other care provider to review them with you.                STOP taking      ibuprofen 200 MG tablet  Commonly known as: ADVIL/MOTRIN                  Where to get your medicines        These medications were sent to [facility] [place] - [address]  [address] [address]      Phone: [phone]   acetaminophen 325 MG tablet  gabapentin 300 MG capsule  naltrexone 50 MG tablet  propranolol ER 60 MG 24 hr capsule  QUEtiapine 25 MG tablet  QUEtiapine 25 MG tablet  thiamine 100 MG tablet       Allergies   No Known Allergies

## 2025-07-22 NOTE — PROGRESS NOTES
[facility] DISCHARGE NOTE    Patient discharged to home at 12:10 PM via cart. Accompanied by father and staff. Discharge instructions reviewed with patient, opportunity offered to ask questions. Prescriptions sent to patients preferred pharmacy. All belongings sent with patient.  Pt  discharged in stable condition.    Colleen Canela RN

## 2025-07-27 ENCOUNTER — HOSPITAL ENCOUNTER (EMERGENCY)
Facility: CLINIC | Age: 30
Discharge: HOME | End: 2025-07-28
Attending: EMERGENCY MEDICINE | Admitting: EMERGENCY MEDICINE
Payer: COMMERCIAL

## 2025-07-27 DIAGNOSIS — R41.82 ALTERED MENTAL STATUS, UNSPECIFIED ALTERED MENTAL STATUS TYPE: Primary | ICD-10-CM

## 2025-07-27 DIAGNOSIS — E87.6 HYPOKALEMIA: ICD-10-CM

## 2025-07-27 DIAGNOSIS — F10.920 ALCOHOLIC INTOXICATION WITHOUT COMPLICATION: ICD-10-CM

## 2025-07-27 DIAGNOSIS — F10.90 ALCOHOL USE DISORDER: ICD-10-CM

## 2025-07-27 PROCEDURE — 99284 EMERGENCY DEPT VISIT MOD MDM: CPT | Mod: 25 | Performed by: EMERGENCY MEDICINE

## 2025-07-27 PROCEDURE — 99285 EMERGENCY DEPT VISIT HI MDM: CPT | Performed by: EMERGENCY MEDICINE

## 2025-07-28 ENCOUNTER — TELEPHONE (OUTPATIENT)
Dept: BEHAVIORAL HEALTH | Facility: CLINIC | Age: 30
End: 2025-07-28
Payer: COMMERCIAL

## 2025-07-28 VITALS
SYSTOLIC BLOOD PRESSURE: 142 MMHG | HEART RATE: 87 BPM | WEIGHT: 166 LBS | TEMPERATURE: 98.4 F | HEIGHT: 70 IN | BODY MASS INDEX: 23.77 KG/M2 | OXYGEN SATURATION: 96 % | DIASTOLIC BLOOD PRESSURE: 91 MMHG | RESPIRATION RATE: 16 BRPM

## 2025-07-28 LAB
ALBUMIN SERPL BCG-MCNC: 4.5 G/DL (ref 3.5–5.2)
ALP SERPL-CCNC: 84 U/L (ref 40–150)
ALT SERPL W P-5'-P-CCNC: 47 U/L (ref 0–70)
ANION GAP SERPL CALCULATED.3IONS-SCNC: 21 MMOL/L (ref 7–15)
AST SERPL W P-5'-P-CCNC: 44 U/L (ref 0–45)
BASOPHILS # BLD AUTO: 0 10E3/UL (ref 0–0.2)
BASOPHILS NFR BLD AUTO: 0 %
BILIRUB SERPL-MCNC: 0.8 MG/DL
BUN SERPL-MCNC: 32.7 MG/DL (ref 6–20)
CALCIUM SERPL-MCNC: 9.1 MG/DL (ref 8.8–10.4)
CHLORIDE SERPL-SCNC: 90 MMOL/L (ref 98–107)
CREAT SERPL-MCNC: 1.49 MG/DL (ref 0.67–1.17)
EGFRCR SERPLBLD CKD-EPI 2021: 64 ML/MIN/1.73M2
EOSINOPHIL # BLD AUTO: 0.1 10E3/UL (ref 0–0.7)
EOSINOPHIL NFR BLD AUTO: 1 %
ERYTHROCYTE [DISTWIDTH] IN BLOOD BY AUTOMATED COUNT: 11.6 % (ref 10–15)
ETHANOL SERPL-MCNC: 0.36 G/DL
GLUCOSE SERPL-MCNC: 119 MG/DL (ref 70–99)
HCO3 SERPL-SCNC: 28 MMOL/L (ref 22–29)
HCT VFR BLD AUTO: 45.7 % (ref 40–53)
HGB BLD-MCNC: 16.3 G/DL (ref 13.3–17.7)
IMM GRANULOCYTES # BLD: 0.1 10E3/UL
IMM GRANULOCYTES NFR BLD: 1 %
LYMPHOCYTES # BLD AUTO: 3.7 10E3/UL (ref 0.8–5.3)
LYMPHOCYTES NFR BLD AUTO: 36 %
MAGNESIUM SERPL-MCNC: 2.3 MG/DL (ref 1.7–2.3)
MCH RBC QN AUTO: 30.7 PG (ref 26.5–33)
MCHC RBC AUTO-ENTMCNC: 35.7 G/DL (ref 31.5–36.5)
MCV RBC AUTO: 86 FL (ref 78–100)
MONOCYTES # BLD AUTO: 0.9 10E3/UL (ref 0–1.3)
MONOCYTES NFR BLD AUTO: 9 %
NEUTROPHILS # BLD AUTO: 5.4 10E3/UL (ref 1.6–8.3)
NEUTROPHILS NFR BLD AUTO: 53 %
NRBC # BLD AUTO: 0 10E3/UL
NRBC BLD AUTO-RTO: 0 /100
PLATELET # BLD AUTO: 541 10E3/UL (ref 150–450)
POTASSIUM SERPL-SCNC: 3 MMOL/L (ref 3.4–5.3)
PROT SERPL-MCNC: 8 G/DL (ref 6.4–8.3)
RBC # BLD AUTO: 5.31 10E6/UL (ref 4.4–5.9)
SODIUM SERPL-SCNC: 139 MMOL/L (ref 135–145)
WBC # BLD AUTO: 10.2 10E3/UL (ref 4–11)

## 2025-07-28 PROCEDURE — 250N000011 HC RX IP 250 OP 636: Performed by: FAMILY MEDICINE

## 2025-07-28 PROCEDURE — 250N000011 HC RX IP 250 OP 636: Performed by: EMERGENCY MEDICINE

## 2025-07-28 PROCEDURE — 80053 COMPREHEN METABOLIC PANEL: CPT | Performed by: EMERGENCY MEDICINE

## 2025-07-28 PROCEDURE — 96361 HYDRATE IV INFUSION ADD-ON: CPT

## 2025-07-28 PROCEDURE — 258N000003 HC RX IP 258 OP 636: Performed by: FAMILY MEDICINE

## 2025-07-28 PROCEDURE — 96375 TX/PRO/DX INJ NEW DRUG ADDON: CPT

## 2025-07-28 PROCEDURE — 96376 TX/PRO/DX INJ SAME DRUG ADON: CPT

## 2025-07-28 PROCEDURE — 83735 ASSAY OF MAGNESIUM: CPT | Performed by: EMERGENCY MEDICINE

## 2025-07-28 PROCEDURE — 85025 COMPLETE CBC W/AUTO DIFF WBC: CPT | Performed by: EMERGENCY MEDICINE

## 2025-07-28 PROCEDURE — 85018 HEMOGLOBIN: CPT | Performed by: EMERGENCY MEDICINE

## 2025-07-28 PROCEDURE — 82077 ASSAY SPEC XCP UR&BREATH IA: CPT | Performed by: EMERGENCY MEDICINE

## 2025-07-28 PROCEDURE — 250N000013 HC RX MED GY IP 250 OP 250 PS 637: Performed by: EMERGENCY MEDICINE

## 2025-07-28 PROCEDURE — 96372 THER/PROPH/DIAG INJ SC/IM: CPT | Performed by: EMERGENCY MEDICINE

## 2025-07-28 PROCEDURE — 250N000013 HC RX MED GY IP 250 OP 250 PS 637: Performed by: FAMILY MEDICINE

## 2025-07-28 PROCEDURE — 96374 THER/PROPH/DIAG INJ IV PUSH: CPT

## 2025-07-28 PROCEDURE — 36415 COLL VENOUS BLD VENIPUNCTURE: CPT | Performed by: EMERGENCY MEDICINE

## 2025-07-28 RX ORDER — ONDANSETRON 4 MG/1
4 TABLET, ORALLY DISINTEGRATING ORAL ONCE
Status: COMPLETED | OUTPATIENT
Start: 2025-07-28 | End: 2025-07-28

## 2025-07-28 RX ORDER — DEXTROSE, SODIUM CHLORIDE, SODIUM LACTATE, POTASSIUM CHLORIDE, AND CALCIUM CHLORIDE 5; .6; .31; .03; .02 G/100ML; G/100ML; G/100ML; G/100ML; G/100ML
1000 INJECTION, SOLUTION INTRAVENOUS ONCE
Status: COMPLETED | OUTPATIENT
Start: 2025-07-28 | End: 2025-07-28

## 2025-07-28 RX ORDER — POTASSIUM CHLORIDE 1500 MG/1
40 TABLET, EXTENDED RELEASE ORAL ONCE
Status: COMPLETED | OUTPATIENT
Start: 2025-07-28 | End: 2025-07-28

## 2025-07-28 RX ORDER — DIAZEPAM 10 MG/2ML
10 INJECTION, SOLUTION INTRAMUSCULAR; INTRAVENOUS ONCE
Status: DISCONTINUED | OUTPATIENT
Start: 2025-07-28 | End: 2025-07-28

## 2025-07-28 RX ORDER — OLANZAPINE 10 MG/2ML
5 INJECTION, POWDER, FOR SOLUTION INTRAMUSCULAR ONCE
Status: COMPLETED | OUTPATIENT
Start: 2025-07-28 | End: 2025-07-28

## 2025-07-28 RX ORDER — DIAZEPAM 10 MG/2ML
10 INJECTION, SOLUTION INTRAMUSCULAR; INTRAVENOUS
Status: DISCONTINUED | OUTPATIENT
Start: 2025-07-28 | End: 2025-07-28 | Stop reason: HOSPADM

## 2025-07-28 RX ORDER — DIAZEPAM 10 MG/2ML
5 INJECTION, SOLUTION INTRAMUSCULAR; INTRAVENOUS ONCE
Status: COMPLETED | OUTPATIENT
Start: 2025-07-28 | End: 2025-07-28

## 2025-07-28 RX ADMIN — POTASSIUM CHLORIDE 40 MEQ: 1500 TABLET, EXTENDED RELEASE ORAL at 13:11

## 2025-07-28 RX ADMIN — DEXTROSE, SODIUM CHLORIDE, SODIUM LACTATE, POTASSIUM CHLORIDE, AND CALCIUM CHLORIDE 1000 ML: 5; .6; .31; .03; .02 INJECTION, SOLUTION INTRAVENOUS at 05:51

## 2025-07-28 RX ADMIN — DIAZEPAM 5 MG: 5 INJECTION, SOLUTION INTRAMUSCULAR; INTRAVENOUS at 05:52

## 2025-07-28 RX ADMIN — DEXTROSE, SODIUM CHLORIDE, SODIUM LACTATE, POTASSIUM CHLORIDE, AND CALCIUM CHLORIDE 1000 ML: 5; .6; .31; .03; .02 INJECTION, SOLUTION INTRAVENOUS at 11:35

## 2025-07-28 RX ADMIN — ONDANSETRON 4 MG: 4 TABLET, ORALLY DISINTEGRATING ORAL at 02:46

## 2025-07-28 RX ADMIN — DIAZEPAM 10 MG: 5 INJECTION, SOLUTION INTRAMUSCULAR; INTRAVENOUS at 11:35

## 2025-07-28 RX ADMIN — OLANZAPINE 5 MG: 10 INJECTION, POWDER, FOR SOLUTION INTRAMUSCULAR at 05:35

## 2025-07-28 RX ADMIN — ONDANSETRON 4 MG: 4 TABLET, ORALLY DISINTEGRATING ORAL at 09:03

## 2025-07-28 RX ADMIN — POTASSIUM CHLORIDE EXTENDED-RELEASE 40 MEQ: 1500 TABLET ORAL at 11:35

## 2025-07-28 RX ADMIN — METOCLOPRAMIDE 10 MG: 5 INJECTION, SOLUTION INTRAMUSCULAR; INTRAVENOUS at 11:35

## 2025-07-28 RX ADMIN — THIAMINE HCL TAB 100 MG 100 MG: 100 TAB at 11:35

## 2025-07-28 ASSESSMENT — ACTIVITIES OF DAILY LIVING (ADL)
ADLS_ACUITY_SCORE: 48

## 2025-07-28 NOTE — TELEPHONE ENCOUNTER
S: [facility] ED , Provider Dr Stanley calling at 5:08 AM with clinical on 29 year old/male presenting for alcohol detox.       Nurse called back @ 5:33am as Intake awaiting Potassium and repeat alcohol, and nursing reports pt no longer wants detox.  Pt removed from Detox board.

## 2025-07-28 NOTE — DISCHARGE INSTRUCTIONS
Please meet with your primary care doctor to discuss substance abuse. If you feel that you are in need of psychiatric help, please feel free to come to the ED.     Please return to the ED if you develop tremors, vomiting, hallucinations, headache, numbness, tingling, SOB, chest pain, or confusion.     Stop drinking so much alcohol. Continued drinking of excessive alcohol will provide many ways for you to get very sick and/or die prematurely. These include but are not limited to a stomach or intestinal bleed from esophageal varices, gastritis, ulcer, or tear in your esophagus. This is often accompanied by vomiting blood and bloody diarrhea.     Excessive drinking very often leads to heart failure or liver failure, and premature death from those causes as well. Liver failure can also lead to hepatic encephalopathy which means you won't be able to think clearly and you may even be in a coma. Additionally, excessive alcohol can cause pancreatitis which is an extremely painful disease process with many potential terrible complications.     Additionally, being intoxicated makes you more vulnerable to being raped, robbed, mugged, assaulted, murdered, or do something that will require correction time. You are more likely to lose your possessions or have them stolen. If you are a male, your sexual performance will likely suffer and your testicles will decrease in size with continued alcohol abuse. With eventual liver failure, your blood can have impaired clotting and make you more prone to bleeding from your gastrointestinal tract or your head should you suffer any kind of head trauma. This can result in paralysis or death. Excessive alcohol use can contribute to seizures and seizure disorders. Having a seizure prevents you from driving a car for at least six months and can impair your ability to work.     Please consider cutting down or finding a treatment program to avoid these most unpleasant complications and/or dying much  earlier than you should. Healthy recommendations are 1 drink/day for women and 2 drinks/day for men with no binge drinking.

## 2025-07-28 NOTE — ED PROVIDER NOTES
History     Chief Complaint   Patient presents with    Alcohol Intoxication     HPI  Brenden Bishop is a 29 year old male with a history of alcohol use disorder who presents for evaluation of altered mental status. He was reportedly found unable to care for self, he was with family who found him unresponsive and called EMS.  EMS reports that his blood glucose was in the normal range and route.  Family reports that he was drinking heavily throughout the day including at least a full bottle of vodka. The patient is unable to provide a coherent, reliable history. There was no reported history of trauma or seizure activity.     I reviewed the patient's discharge summary from 7/22/2025, was admitted to the hospital for acute kidney injury and rhabdomyolysis with alcohol withdrawal.    Allergies:  No Known Allergies    Problem List:    Patient Active Problem List    Diagnosis Date Noted    Hypochloremia 07/19/2025     Priority: Medium    Hyperphosphatemia 07/19/2025     Priority: Medium    Hyperglycemia 07/19/2025     Priority: Medium    Elevated troponin 07/19/2025     Priority: Medium    Atrial fibrillation with RVR (H) 07/19/2025     Priority: Medium    Acute kidney injury 07/19/2025     Priority: Medium    Elevated brain natriuretic peptide (BNP) level 07/19/2025     Priority: Medium    Elevated bilirubin 07/19/2025     Priority: Medium    Elevated lipase 07/19/2025     Priority: Medium    Elevated AST (SGOT) 07/19/2025     Priority: Medium    Alcohol withdrawal syndrome, with unspecified complication (H) 07/19/2025     Priority: Medium    Metabolic alkalosis with respiratory acidosis 07/19/2025     Priority: Medium    Alcohol withdrawal syndrome without complication (H) 11/14/2022     Priority: Medium    Alcohol-induced acute pancreatitis 06/21/2022     Priority: Medium    Amphetamine use disorder, severe, dependence (H) 03/25/2022     Priority: Medium     Formatting of this note might be different from the  original.  AMS, found c crushed adderall and DEBORAH >0.6.      PTSD (post-traumatic stress disorder) 01/20/2017     Priority: Medium    Major depressive disorder, recurrent, in partial remission 12/23/2016     Priority: Medium    Panic attacks 12/23/2016     Priority: Medium    Social anxiety disorder 12/23/2016     Priority: Medium    Eating disorder 07/11/2014     Priority: Medium    Bipolar 1 disorder (H) 06/09/2014     Priority: Medium    Suicide attempt (H) 06/07/2014     Priority: Medium    Head ache 09/26/2011     Priority: Medium    Anxiety state 09/25/2011     Priority: Medium     Problem list name updated by automated process. Provider to review      Cannabis use disorder, mild, abuse 09/25/2011     Priority: Medium     Problem list name updated by automated process. Provider to review      Alcohol use disorder 09/25/2011     Priority: Medium     Problem list name updated by automated process. Provider to review      Asthma, cough variant 06/07/2010     Priority: Medium    Attention deficit hyperactivity disorder (ADHD) 03/03/2008     Priority: Medium     Problem list name updated by automated process. Provider to review    Formatting of this note might be different from the original.  Rule out ADHD ([facility])          Past Medical History:    Past Medical History:   Diagnosis Date    Alcohol use disorder 09/25/2011    Alcohol-induced acute pancreatitis 06/21/2022    Amphetamine use disorder, severe, dependence (H) 03/25/2022    Anxiety state 09/25/2011    Asthma, cough variant 06/07/2010    Bipolar 1 disorder (H) 06/09/2014    Cannabis use disorder, mild, abuse 09/25/2011    Major depressive disorder, recurrent, in partial remission 12/23/2016    PTSD (post-traumatic stress disorder) 01/20/2017    Social anxiety disorder 12/23/2016    Suicide attempt (H) 06/07/2014       Past Surgical History:    Past Surgical History:   Procedure Laterality Date    ORTHOPEDIC SURGERY      wrist surg        Family History:    No  "family history on file.    Social History:  Marital Status:  Single [1]  Social History     Tobacco Use    Smoking status: Former    Smokeless tobacco: Current   Substance Use Topics    Alcohol use: Yes     Comment: 1 liter daily    Drug use: Yes     Types: Marijuana        Medications:    acetaminophen (TYLENOL) 325 MG tablet  gabapentin (NEURONTIN) 300 MG capsule  naltrexone (DEPADE/REVIA) 50 MG tablet  propranolol ER (INDERAL LA) 60 MG 24 hr capsule  QUEtiapine (SEROQUEL) 25 MG tablet  QUEtiapine (SEROQUEL) 25 MG tablet  thiamine (B-1) 100 MG tablet          Review of Systems    Physical Exam   BP: 127/83  Pulse: 83  Temp: 98.4  F (36.9  C)  Resp: 16  Height: 177.8 cm (5' 10\")  Weight: 75.3 kg (166 lb)  SpO2: 96 %      Physical Exam  Gen: Resting comfortably on the [facility], slow to respond to questioning  Heent: Atraumatic. Nystagmus noted, eomi, perrla.   Neck: Supple, no meningismus.   Lungs: CTAB.   CVS: Regular, no murmurs.   ADB: Soft, NT, ND.   Back: Atraumatic.   Pelvis: Stable, normal.   Extremities: No trauma, pulses intact, cap refill normal.   Neuro: Slurred speech, cerebellar impairment, limited ability to follow commands    ED Course        Procedures              Critical Care time:  none     None         Recent Results (from the past 24 hours)   CBC with Platelets and Differential (Limited Occurrences)    Narrative    The following orders were created for panel order CBC with Platelets and Differential (Limited Occurrences).  Procedure                               Abnormality         Status                     ---------                               -----------         ------                     CBC with platelets and ...[[id number]]  Abnormal            Final result                 Please view results for these tests on the individual orders.   Comprehensive Metabolic Panel (Limited Occurrences)   Result Value Ref Range    Sodium 139 135 - 145 mmol/L    Potassium 3.0 (L) 3.4 - 5.3 mmol/L "    Carbon Dioxide (CO2) 28 22 - 29 mmol/L    Anion Gap 21 (H) 7 - 15 mmol/L    Urea Nitrogen 32.7 (H) 6.0 - 20.0 mg/dL    Creatinine 1.49 (H) 0.67 - 1.17 mg/dL    GFR Estimate 64 >60 mL/min/1.73m2    Calcium 9.1 8.8 - 10.4 mg/dL    Chloride 90 (L) 98 - 107 mmol/L    Glucose 119 (H) 70 - 99 mg/dL    Alkaline Phosphatase 84 40 - 150 U/L    AST 44 0 - 45 U/L    ALT 47 0 - 70 U/L    Protein Total 8.0 6.4 - 8.3 g/dL    Albumin 4.5 3.5 - 5.2 g/dL    Bilirubin Total 0.8 <=1.2 mg/dL   Ethanol Level Blood   Result Value Ref Range    Ethanol Level Blood 0.36 (HH) <=0.01 g/dL   Magnesium (Limited Occurrences)   Result Value Ref Range    Magnesium 2.3 1.7 - 2.3 mg/dL   CBC with platelets and differential   Result Value Ref Range    WBC Count 10.2 4.0 - 11.0 10e3/uL    RBC Count 5.31 4.40 - 5.90 10e6/uL    Hemoglobin 16.3 13.3 - 17.7 g/dL    Hematocrit 45.7 40.0 - 53.0 %    MCV 86 78 - 100 fL    MCH 30.7 26.5 - 33.0 pg    MCHC 35.7 31.5 - 36.5 g/dL    RDW 11.6 10.0 - 15.0 %    Platelet Count 541 (H) 150 - 450 10e3/uL    % Neutrophils 53 %    % Lymphocytes 36 %    % Monocytes 9 %    % Eosinophils 1 %    % Basophils 0 %    % Immature Granulocytes 1 %    NRBCs per 100 WBC 0 <1 /100    Absolute Neutrophils 5.4 1.6 - 8.3 10e3/uL    Absolute Lymphocytes 3.7 0.8 - 5.3 10e3/uL    Absolute Monocytes 0.9 0.0 - 1.3 10e3/uL    Absolute Eosinophils 0.1 0.0 - 0.7 10e3/uL    Absolute Basophils 0.0 0.0 - 0.2 10e3/uL    Absolute Immature Granulocytes 0.1 <=0.4 10e3/uL    Absolute NRBCs 0.0 10e3/uL       Medications   potassium chloride kristine ER (KLOR-CON M20) CR tablet 40 mEq (has no administration in time range)   ondansetron (ZOFRAN ODT) ODT tab 4 mg (4 mg Oral $Given 7/28/25 0246)   ondansetron (ZOFRAN ODT) ODT tab 4 mg (4 mg Oral $Given 7/28/25 0523)       Assessments & Plan (with Medical Decision Making)   ASSESS/PLAN:  AMS, likely secondary to etoh, cannot rule out more serious pathology initially but planned observation for need for  further evaluation, hospitalization considered given the patient's degree of altered mental status but will await trial of sobriety first.  Vital signs are reassuring here.  If AMS does not clear appropriately, as anticipated, further work up with possible head ct, drug screen or lab testing may be indicated.     ED COURSE:  After observation, patient cleared as expected. No signs of traumatic injuries, no complaints. AMS likely secondary to etoh.  Patient admits etoh; denies cp, sob, abd pain, focal neuro complaints.  Remainder of ROS negative.  At this time the patient said that he was interested in going to detox and quitting alcohol and wanted help.  Therefore blood work is drawn and we reached out to inpatient detox at [facility].  He is given ondansetron for nausea.  His potassium is low at 3.0 and he is given oral replacement.  His serum ethanol level still quite elevated at 0.36.  White blood succumbs 7.2 and his hemoglobin is 16.3.  Magnesium is normal.    Unfortunately after somewhat time in the emergency department the patient is now elected to not go to detox.  He says that he just wants to go home.  I counseled him on alcohol cessation I have told him to return anytime if he changes his mind.  He states understanding.  Unfortunately he did develop some vomiting in the morning and was given ondansetron and then a shot of olanzapine.  He is given a liter of D5 NS given his anion gap of 21 in case of a stroke presents with alcohol ketoacidosis.  I also gave him a dose of IV diazepam because he was starting to feel jittery and have some mild tachycardia and given his history of drinking he is very likely to be going into early alcohol withdrawal even at this stage.  He is signed out at change of shift awaiting safe discharge ride.    I have reviewed the nursing notes.    I have reviewed the findings, diagnosis, plan and need for follow up with the patient.             New Prescriptions    No medications on  file       Final diagnoses:   Altered mental status, unspecified altered mental status type   Alcohol use disorder   Alcoholic intoxication without complication       7/27/2025   [facility] EMERGENCY DEPT       Emerson Stanley MD  07/28/25 0532       Emerson Stanley MD  07/28/25 0569

## 2025-07-28 NOTE — ED PROVIDER NOTES
"     Emergency Department Patient Sign-out       Brief HPI:  This is a 29 year old male signed out to me by Dr. Stanley .  See initial ED Provider note for details of the presentation.     alcohol intoxication  does not want detox.  anion gap metabolic acidosis  vomiting      Significant Events prior to my assuming care:       Exam:   Patient Vitals for the past 24 hrs:   BP Temp Temp src Pulse Resp SpO2 Height Weight   07/28/25 0526 126/81 -- -- 100 -- 96 % -- --   07/28/25 0240 -- -- -- -- -- -- 1.778 m (5' 10\") 75.3 kg (166 lb)   07/28/25 0228 128/82 -- -- 92 -- 97 % -- --   07/27/25 2310 127/83 98.4  F (36.9  C) Oral 83 16 96 % -- --           ED RESULTS:   Results for orders placed or performed during the hospital encounter of 07/27/25 (from the past 24 hours)   CBC with Platelets and Differential (Limited Occurrences)     Status: Abnormal    Collection Time: 07/28/25  2:46 AM    Narrative    The following orders were created for panel order CBC with Platelets and Differential (Limited Occurrences).  Procedure                               Abnormality         Status                     ---------                               -----------         ------                     CBC with platelets and ...[[id number]]  Abnormal            Final result                 Please view results for these tests on the individual orders.   Comprehensive Metabolic Panel (Limited Occurrences)     Status: Abnormal    Collection Time: 07/28/25  2:46 AM   Result Value Ref Range    Sodium 139 135 - 145 mmol/L    Potassium 3.0 (L) 3.4 - 5.3 mmol/L    Carbon Dioxide (CO2) 28 22 - 29 mmol/L    Anion Gap 21 (H) 7 - 15 mmol/L    Urea Nitrogen 32.7 (H) 6.0 - 20.0 mg/dL    Creatinine 1.49 (H) 0.67 - 1.17 mg/dL    GFR Estimate 64 >60 mL/min/1.73m2    Calcium 9.1 8.8 - 10.4 mg/dL    Chloride 90 (L) 98 - 107 mmol/L    Glucose 119 (H) 70 - 99 mg/dL    Alkaline Phosphatase 84 40 - 150 U/L    AST 44 0 - 45 U/L    ALT 47 0 - 70 U/L    Protein Total " 8.0 6.4 - 8.3 g/dL    Albumin 4.5 3.5 - 5.2 g/dL    Bilirubin Total 0.8 <=1.2 mg/dL   Ethanol Level Blood     Status: Abnormal    Collection Time: 07/28/25  2:46 AM   Result Value Ref Range    Ethanol Level Blood 0.36 (HH) <=0.01 g/dL   Magnesium (Limited Occurrences)     Status: Normal    Collection Time: 07/28/25  2:46 AM   Result Value Ref Range    Magnesium 2.3 1.7 - 2.3 mg/dL   CBC with platelets and differential     Status: Abnormal    Collection Time: 07/28/25  2:46 AM   Result Value Ref Range    WBC Count 10.2 4.0 - 11.0 10e3/uL    RBC Count 5.31 4.40 - 5.90 10e6/uL    Hemoglobin 16.3 13.3 - 17.7 g/dL    Hematocrit 45.7 40.0 - 53.0 %    MCV 86 78 - 100 fL    MCH 30.7 26.5 - 33.0 pg    MCHC 35.7 31.5 - 36.5 g/dL    RDW 11.6 10.0 - 15.0 %    Platelet Count 541 (H) 150 - 450 10e3/uL    % Neutrophils 53 %    % Lymphocytes 36 %    % Monocytes 9 %    % Eosinophils 1 %    % Basophils 0 %    % Immature Granulocytes 1 %    NRBCs per 100 WBC 0 <1 /100    Absolute Neutrophils 5.4 1.6 - 8.3 10e3/uL    Absolute Lymphocytes 3.7 0.8 - 5.3 10e3/uL    Absolute Monocytes 0.9 0.0 - 1.3 10e3/uL    Absolute Eosinophils 0.1 0.0 - 0.7 10e3/uL    Absolute Basophils 0.0 0.0 - 0.2 10e3/uL    Absolute Immature Granulocytes 0.1 <=0.4 10e3/uL    Absolute NRBCs 0.0 10e3/uL       ED MEDICATIONS:   Medications   potassium chloride kristine ER (KLOR-CON M20) CR tablet 40 mEq (has no administration in time range)   ondansetron (ZOFRAN ODT) ODT tab 4 mg (4 mg Oral $Given 7/28/25 9436)   ondansetron (ZOFRAN ODT) ODT tab 4 mg (4 mg Oral Not Given 7/28/25 3832)   OLANZapine (zyPREXA) injection 5 mg (5 mg Intramuscular $Given 7/28/25 7697)   dextrose 5% in lactated ringers infusion (1,000 mLs Intravenous $New Bag 7/28/25 8977)   diazepam (VALIUM) injection 5 mg (5 mg Intravenous $Given 7/28/25 2352)         Impression:    ICD-10-CM    1. Altered mental status, unspecified altered mental status type  R41.82       2. Alcohol use disorder  F10.90        3. Alcoholic intoxication without complication  F10.920           Plan:    Pending studies include antiemetics, IV hydration.        MD Ayden Eduardo Scott J, MD  07/28/25 0668    Reevaluated the patient after he had received initial meds.  He still felt nauseous and anxious additional Valium was given as well as additional IV fluid and IV Reglan for nausea.  He continues to refuse detox and will discharge home.  He is given 1 additional dose of potassium replacement before discharge.  Also given thiamine here         Rene Hensley MD  07/28/25 8208

## 2025-07-28 NOTE — ED TRIAGE NOTES
"Pt has a history of alcohol abuse. Family states pt had been drinking tonight, reportedly was found passed out. EMS states pt was oriented by the time they arrived on scene. . Pt is alert at this time, but refuses to answer questions. When asked his name, pt states \"no.\"        Triage Assessment (Adult)       Row Name 07/27/25 7173          Triage Assessment    Airway WDL WDL        Respiratory WDL    Respiratory WDL WDL        Skin Circulation/Temperature WDL    Skin Circulation/Temperature WDL WDL        Cardiac WDL    Cardiac WDL WDL        Peripheral/Neurovascular WDL    Peripheral Neurovascular WDL WDL        Cognitive/Neuro/Behavioral WDL    Cognitive/Neuro/Behavioral WDL WDL           "

## 2025-07-31 ENCOUNTER — HOSPITAL ENCOUNTER (EMERGENCY)
Facility: CLINIC | Age: 30
Discharge: HOME | End: 2025-08-01
Attending: STUDENT IN AN ORGANIZED HEALTH CARE EDUCATION/TRAINING PROGRAM | Admitting: STUDENT IN AN ORGANIZED HEALTH CARE EDUCATION/TRAINING PROGRAM
Payer: COMMERCIAL

## 2025-07-31 DIAGNOSIS — F10.929 ALCOHOLIC INTOXICATION WITH COMPLICATION: Primary | ICD-10-CM

## 2025-07-31 DIAGNOSIS — E87.6 HYPOKALEMIA: ICD-10-CM

## 2025-07-31 PROCEDURE — 99284 EMERGENCY DEPT VISIT MOD MDM: CPT | Performed by: STUDENT IN AN ORGANIZED HEALTH CARE EDUCATION/TRAINING PROGRAM

## 2025-07-31 PROCEDURE — 99284 EMERGENCY DEPT VISIT MOD MDM: CPT | Mod: 25 | Performed by: STUDENT IN AN ORGANIZED HEALTH CARE EDUCATION/TRAINING PROGRAM

## 2025-07-31 RX ORDER — ONDANSETRON 2 MG/ML
4 INJECTION INTRAMUSCULAR; INTRAVENOUS EVERY 30 MIN PRN
Status: COMPLETED | OUTPATIENT
Start: 2025-07-31 | End: 2025-08-01

## 2025-07-31 ASSESSMENT — COLUMBIA-SUICIDE SEVERITY RATING SCALE - C-SSRS
1. IN THE PAST MONTH, HAVE YOU WISHED YOU WERE DEAD OR WISHED YOU COULD GO TO SLEEP AND NOT WAKE UP?: NO
6. HAVE YOU EVER DONE ANYTHING, STARTED TO DO ANYTHING, OR PREPARED TO DO ANYTHING TO END YOUR LIFE?: NO
2. HAVE YOU ACTUALLY HAD ANY THOUGHTS OF KILLING YOURSELF IN THE PAST MONTH?: NO

## 2025-08-01 VITALS
BODY MASS INDEX: 22.51 KG/M2 | SYSTOLIC BLOOD PRESSURE: 128 MMHG | DIASTOLIC BLOOD PRESSURE: 75 MMHG | HEIGHT: 72 IN | HEART RATE: 120 BPM | RESPIRATION RATE: 18 BRPM | OXYGEN SATURATION: 92 %

## 2025-08-01 LAB
ALBUMIN SERPL BCG-MCNC: 4.4 G/DL (ref 3.5–5.2)
ALP SERPL-CCNC: 89 U/L (ref 40–150)
ALT SERPL W P-5'-P-CCNC: 31 U/L (ref 0–70)
ANION GAP SERPL CALCULATED.3IONS-SCNC: 15 MMOL/L (ref 7–15)
ANION GAP SERPL CALCULATED.3IONS-SCNC: 22 MMOL/L (ref 7–15)
AST SERPL W P-5'-P-CCNC: 27 U/L (ref 0–45)
BASOPHILS # BLD AUTO: 0 10E3/UL (ref 0–0.2)
BASOPHILS NFR BLD AUTO: 0 %
BILIRUB SERPL-MCNC: 0.6 MG/DL
BUN SERPL-MCNC: 19.6 MG/DL (ref 6–20)
BUN SERPL-MCNC: 23.9 MG/DL (ref 6–20)
CALCIUM SERPL-MCNC: 8.1 MG/DL (ref 8.8–10.4)
CALCIUM SERPL-MCNC: 8.7 MG/DL (ref 8.8–10.4)
CHLORIDE SERPL-SCNC: 86 MMOL/L (ref 98–107)
CHLORIDE SERPL-SCNC: 94 MMOL/L (ref 98–107)
CREAT SERPL-MCNC: 1.1 MG/DL (ref 0.67–1.17)
CREAT SERPL-MCNC: 1.22 MG/DL (ref 0.67–1.17)
EGFRCR SERPLBLD CKD-EPI 2021: 81 ML/MIN/1.73M2
EGFRCR SERPLBLD CKD-EPI 2021: >90 ML/MIN/1.73M2
EOSINOPHIL # BLD AUTO: 0 10E3/UL (ref 0–0.7)
EOSINOPHIL NFR BLD AUTO: 0 %
ERYTHROCYTE [DISTWIDTH] IN BLOOD BY AUTOMATED COUNT: 11.6 % (ref 10–15)
ETHANOL SERPL-MCNC: 0.46 G/DL
GLUCOSE SERPL-MCNC: 131 MG/DL (ref 70–99)
GLUCOSE SERPL-MCNC: 161 MG/DL (ref 70–99)
HCO3 SERPL-SCNC: 28 MMOL/L (ref 22–29)
HCO3 SERPL-SCNC: 30 MMOL/L (ref 22–29)
HCT VFR BLD AUTO: 47.5 % (ref 40–53)
HGB BLD-MCNC: 17.4 G/DL (ref 13.3–17.7)
IMM GRANULOCYTES # BLD: 0.1 10E3/UL
IMM GRANULOCYTES NFR BLD: 1 %
LYMPHOCYTES # BLD AUTO: 2.9 10E3/UL (ref 0.8–5.3)
LYMPHOCYTES NFR BLD AUTO: 16 %
MAGNESIUM SERPL-MCNC: 2 MG/DL (ref 1.7–2.3)
MCH RBC QN AUTO: 31 PG (ref 26.5–33)
MCHC RBC AUTO-ENTMCNC: 36.6 G/DL (ref 31.5–36.5)
MCV RBC AUTO: 85 FL (ref 78–100)
MONOCYTES # BLD AUTO: 0.5 10E3/UL (ref 0–1.3)
MONOCYTES NFR BLD AUTO: 3 %
NEUTROPHILS # BLD AUTO: 14.1 10E3/UL (ref 1.6–8.3)
NEUTROPHILS NFR BLD AUTO: 80 %
NRBC # BLD AUTO: 0 10E3/UL
NRBC BLD AUTO-RTO: 0 /100
PLATELET # BLD AUTO: 593 10E3/UL (ref 150–450)
POTASSIUM SERPL-SCNC: 2.7 MMOL/L (ref 3.4–5.3)
POTASSIUM SERPL-SCNC: 3.2 MMOL/L (ref 3.4–5.3)
PROT SERPL-MCNC: 7.7 G/DL (ref 6.4–8.3)
RBC # BLD AUTO: 5.61 10E6/UL (ref 4.4–5.9)
SODIUM SERPL-SCNC: 137 MMOL/L (ref 135–145)
SODIUM SERPL-SCNC: 138 MMOL/L (ref 135–145)
WBC # BLD AUTO: 17.6 10E3/UL (ref 4–11)

## 2025-08-01 PROCEDURE — 96376 TX/PRO/DX INJ SAME DRUG ADON: CPT | Performed by: STUDENT IN AN ORGANIZED HEALTH CARE EDUCATION/TRAINING PROGRAM

## 2025-08-01 PROCEDURE — 83735 ASSAY OF MAGNESIUM: CPT | Performed by: STUDENT IN AN ORGANIZED HEALTH CARE EDUCATION/TRAINING PROGRAM

## 2025-08-01 PROCEDURE — 96360 HYDRATION IV INFUSION INIT: CPT | Mod: XS | Performed by: STUDENT IN AN ORGANIZED HEALTH CARE EDUCATION/TRAINING PROGRAM

## 2025-08-01 PROCEDURE — 82077 ASSAY SPEC XCP UR&BREATH IA: CPT | Performed by: STUDENT IN AN ORGANIZED HEALTH CARE EDUCATION/TRAINING PROGRAM

## 2025-08-01 PROCEDURE — 250N000011 HC RX IP 250 OP 636: Performed by: STUDENT IN AN ORGANIZED HEALTH CARE EDUCATION/TRAINING PROGRAM

## 2025-08-01 PROCEDURE — 258N000003 HC RX IP 258 OP 636: Performed by: STUDENT IN AN ORGANIZED HEALTH CARE EDUCATION/TRAINING PROGRAM

## 2025-08-01 PROCEDURE — 36415 COLL VENOUS BLD VENIPUNCTURE: CPT | Performed by: STUDENT IN AN ORGANIZED HEALTH CARE EDUCATION/TRAINING PROGRAM

## 2025-08-01 PROCEDURE — 250N000013 HC RX MED GY IP 250 OP 250 PS 637: Performed by: STUDENT IN AN ORGANIZED HEALTH CARE EDUCATION/TRAINING PROGRAM

## 2025-08-01 PROCEDURE — 96365 THER/PROPH/DIAG IV INF INIT: CPT | Performed by: STUDENT IN AN ORGANIZED HEALTH CARE EDUCATION/TRAINING PROGRAM

## 2025-08-01 PROCEDURE — 96361 HYDRATE IV INFUSION ADD-ON: CPT | Performed by: STUDENT IN AN ORGANIZED HEALTH CARE EDUCATION/TRAINING PROGRAM

## 2025-08-01 PROCEDURE — 96375 TX/PRO/DX INJ NEW DRUG ADDON: CPT | Performed by: STUDENT IN AN ORGANIZED HEALTH CARE EDUCATION/TRAINING PROGRAM

## 2025-08-01 PROCEDURE — 80053 COMPREHEN METABOLIC PANEL: CPT | Performed by: STUDENT IN AN ORGANIZED HEALTH CARE EDUCATION/TRAINING PROGRAM

## 2025-08-01 PROCEDURE — 85014 HEMATOCRIT: CPT | Performed by: STUDENT IN AN ORGANIZED HEALTH CARE EDUCATION/TRAINING PROGRAM

## 2025-08-01 RX ORDER — POTASSIUM CHLORIDE 7.45 MG/ML
10 INJECTION INTRAVENOUS ONCE
Status: COMPLETED | OUTPATIENT
Start: 2025-08-01 | End: 2025-08-01

## 2025-08-01 RX ORDER — MULTIPLE VITAMINS W/ MINERALS TAB 9MG-400MCG
1 TAB ORAL ONCE
Status: COMPLETED | OUTPATIENT
Start: 2025-08-01 | End: 2025-08-01

## 2025-08-01 RX ORDER — FOLIC ACID 1 MG/1
1 TABLET ORAL ONCE
Status: COMPLETED | OUTPATIENT
Start: 2025-08-01 | End: 2025-08-01

## 2025-08-01 RX ORDER — POTASSIUM CHLORIDE 1.5 G/1.58G
40 POWDER, FOR SOLUTION ORAL ONCE
Status: COMPLETED | OUTPATIENT
Start: 2025-08-01 | End: 2025-08-01

## 2025-08-01 RX ORDER — DEXTROSE, SODIUM CHLORIDE, SODIUM LACTATE, POTASSIUM CHLORIDE, AND CALCIUM CHLORIDE 5; .6; .31; .03; .02 G/100ML; G/100ML; G/100ML; G/100ML; G/100ML
1000 INJECTION, SOLUTION INTRAVENOUS ONCE
Status: COMPLETED | OUTPATIENT
Start: 2025-08-01 | End: 2025-08-01

## 2025-08-01 RX ORDER — POTASSIUM CHLORIDE 1500 MG/1
40 TABLET, EXTENDED RELEASE ORAL ONCE
Status: COMPLETED | OUTPATIENT
Start: 2025-08-01 | End: 2025-08-01

## 2025-08-01 RX ADMIN — Medication 1 TABLET: at 00:43

## 2025-08-01 RX ADMIN — POTASSIUM CHLORIDE EXTENDED-RELEASE 40 MEQ: 1500 TABLET ORAL at 05:14

## 2025-08-01 RX ADMIN — ONDANSETRON 4 MG: 2 INJECTION INTRAMUSCULAR; INTRAVENOUS at 04:56

## 2025-08-01 RX ADMIN — ONDANSETRON 4 MG: 2 INJECTION INTRAMUSCULAR; INTRAVENOUS at 07:30

## 2025-08-01 RX ADMIN — POTASSIUM CHLORIDE 40 MEQ: 1.5 POWDER, FOR SOLUTION ORAL at 00:43

## 2025-08-01 RX ADMIN — THIAMINE HCL TAB 100 MG 100 MG: 100 TAB at 00:43

## 2025-08-01 RX ADMIN — ONDANSETRON 4 MG: 2 INJECTION INTRAMUSCULAR; INTRAVENOUS at 00:02

## 2025-08-01 RX ADMIN — POTASSIUM CHLORIDE 10 MEQ: 7.46 INJECTION, SOLUTION INTRAVENOUS at 00:40

## 2025-08-01 RX ADMIN — FOLIC ACID 1 MG: 1 TABLET ORAL at 00:43

## 2025-08-01 RX ADMIN — DEXTROSE, SODIUM CHLORIDE, SODIUM LACTATE, POTASSIUM CHLORIDE, AND CALCIUM CHLORIDE 1000 ML: 5; .6; .31; .03; .02 INJECTION, SOLUTION INTRAVENOUS at 00:39

## 2025-08-01 RX ADMIN — SODIUM CHLORIDE 1000 ML: 0.9 INJECTION, SOLUTION INTRAVENOUS at 00:03

## 2025-08-01 ASSESSMENT — LIFESTYLE VARIABLES
ORIENTATION AND CLOUDING OF SENSORIUM: ORIENTED AND CAN DO SERIAL ADDITIONS
AUDITORY DISTURBANCES: NOT PRESENT
TREMOR: NOT VISIBLE, BUT CAN BE FELT FINGERTIP TO FINGERTIP
TOTAL SCORE: 1
ANXIETY: NO ANXIETY, AT EASE
VISUAL DISTURBANCES: NOT PRESENT
NAUSEA AND VOMITING: 5
ANXIETY: NO ANXIETY, AT EASE
AGITATION: NORMAL ACTIVITY
VISUAL DISTURBANCES: NOT PRESENT
PAROXYSMAL SWEATS: NO SWEAT VISIBLE
PAROXYSMAL SWEATS: 2
HEADACHE, FULLNESS IN HEAD: NOT PRESENT
AUDITORY DISTURBANCES: NOT PRESENT
ORIENTATION AND CLOUDING OF SENSORIUM: CANNOT DO SERIAL ADDITIONS OR IS UNCERTAIN ABOUT DATE
TREMOR: NOT VISIBLE, BUT CAN BE FELT FINGERTIP TO FINGERTIP
AGITATION: NORMAL ACTIVITY
NAUSEA AND VOMITING: NO NAUSEA AND NO VOMITING
TOTAL SCORE: 9
HEADACHE, FULLNESS IN HEAD: NOT PRESENT

## 2025-08-01 ASSESSMENT — ACTIVITIES OF DAILY LIVING (ADL)
ADLS_ACUITY_SCORE: 48